# Patient Record
Sex: FEMALE | Race: WHITE | NOT HISPANIC OR LATINO | URBAN - METROPOLITAN AREA
[De-identification: names, ages, dates, MRNs, and addresses within clinical notes are randomized per-mention and may not be internally consistent; named-entity substitution may affect disease eponyms.]

---

## 2020-10-08 ENCOUNTER — OFFICE (OUTPATIENT)
Dept: URBAN - METROPOLITAN AREA CLINIC 64 | Facility: CLINIC | Age: 40
End: 2020-10-08
Payer: COMMERCIAL

## 2020-10-08 VITALS
HEART RATE: 75 BPM | SYSTOLIC BLOOD PRESSURE: 138 MMHG | HEIGHT: 66 IN | DIASTOLIC BLOOD PRESSURE: 89 MMHG | WEIGHT: 211 LBS

## 2020-10-08 DIAGNOSIS — K59.09 OTHER CONSTIPATION: ICD-10-CM

## 2020-10-08 DIAGNOSIS — R10.32 LEFT LOWER QUADRANT PAIN: ICD-10-CM

## 2020-10-08 DIAGNOSIS — R19.5 OTHER FECAL ABNORMALITIES: ICD-10-CM

## 2020-10-08 DIAGNOSIS — K55.061: ICD-10-CM

## 2020-10-08 DIAGNOSIS — M54.5 LOW BACK PAIN: ICD-10-CM

## 2020-10-08 PROCEDURE — 99203 OFFICE O/P NEW LOW 30 MIN: CPT | Performed by: INTERNAL MEDICINE

## 2020-10-08 RX ORDER — LINACLOTIDE 290 UG/1
290 CAPSULE, GELATIN COATED ORAL
Qty: 90 | Refills: 3 | Status: COMPLETED
Start: 2020-10-08 | End: 2020-11-17

## 2020-11-18 ENCOUNTER — ON CAMPUS - OUTPATIENT (OUTPATIENT)
Dept: URBAN - METROPOLITAN AREA HOSPITAL 2 | Facility: HOSPITAL | Age: 40
End: 2020-11-18
Payer: COMMERCIAL

## 2020-11-18 VITALS
SYSTOLIC BLOOD PRESSURE: 120 MMHG | DIASTOLIC BLOOD PRESSURE: 86 MMHG | HEART RATE: 88 BPM | DIASTOLIC BLOOD PRESSURE: 72 MMHG | HEART RATE: 78 BPM | HEIGHT: 66 IN | OXYGEN SATURATION: 99 % | HEART RATE: 71 BPM | RESPIRATION RATE: 16 BRPM | OXYGEN SATURATION: 98 % | WEIGHT: 208 LBS | DIASTOLIC BLOOD PRESSURE: 96 MMHG | SYSTOLIC BLOOD PRESSURE: 122 MMHG | SYSTOLIC BLOOD PRESSURE: 131 MMHG | HEART RATE: 81 BPM | OXYGEN SATURATION: 100 % | SYSTOLIC BLOOD PRESSURE: 126 MMHG | DIASTOLIC BLOOD PRESSURE: 83 MMHG | TEMPERATURE: 97.6 F | DIASTOLIC BLOOD PRESSURE: 85 MMHG | HEART RATE: 90 BPM | RESPIRATION RATE: 18 BRPM | HEART RATE: 79 BPM | DIASTOLIC BLOOD PRESSURE: 84 MMHG | DIASTOLIC BLOOD PRESSURE: 80 MMHG | HEART RATE: 99 BPM | SYSTOLIC BLOOD PRESSURE: 115 MMHG | SYSTOLIC BLOOD PRESSURE: 150 MMHG | RESPIRATION RATE: 17 BRPM

## 2020-11-18 DIAGNOSIS — R10.32 LEFT LOWER QUADRANT PAIN: ICD-10-CM

## 2020-11-18 DIAGNOSIS — K64.0 FIRST DEGREE HEMORRHOIDS: ICD-10-CM

## 2020-11-18 PROCEDURE — 45378 DIAGNOSTIC COLONOSCOPY: CPT | Performed by: INTERNAL MEDICINE

## 2023-04-07 ENCOUNTER — APPOINTMENT (OUTPATIENT)
Dept: MRI IMAGING | Facility: HOSPITAL | Age: 43
End: 2023-04-07
Payer: MEDICAID

## 2023-04-07 ENCOUNTER — APPOINTMENT (OUTPATIENT)
Dept: CT IMAGING | Facility: HOSPITAL | Age: 43
End: 2023-04-07
Payer: MEDICAID

## 2023-04-07 ENCOUNTER — HOSPITAL ENCOUNTER (OUTPATIENT)
Facility: HOSPITAL | Age: 43
Setting detail: OBSERVATION
Discharge: HOME | End: 2023-04-09
Attending: EMERGENCY MEDICINE | Admitting: EMERGENCY MEDICINE
Payer: MEDICAID

## 2023-04-07 ENCOUNTER — APPOINTMENT (OUTPATIENT)
Dept: GENERAL RADIOLOGY | Facility: HOSPITAL | Age: 43
End: 2023-04-07
Payer: MEDICAID

## 2023-04-07 DIAGNOSIS — M54.10: ICD-10-CM

## 2023-04-07 DIAGNOSIS — R53.1 LEFT-SIDED WEAKNESS: Primary | ICD-10-CM

## 2023-04-07 DIAGNOSIS — M51.36 BULGING OF LUMBAR INTERVERTEBRAL DISC WITHOUT MYELOPATHY: ICD-10-CM

## 2023-04-07 DIAGNOSIS — M51.36 BULGING LUMBAR DISC: ICD-10-CM

## 2023-04-07 DIAGNOSIS — M15.9 PRIMARY OSTEOARTHRITIS INVOLVING MULTIPLE JOINTS: ICD-10-CM

## 2023-04-07 PROBLEM — G44.209 TENSION TYPE HEADACHE: Status: ACTIVE | Noted: 2023-04-07

## 2023-04-07 PROBLEM — G50.0 TRIGEMINAL NEURALGIA: Status: ACTIVE | Noted: 2023-04-07

## 2023-04-07 PROBLEM — M19.91 PRIMARY OSTEOARTHRITIS: Status: ACTIVE | Noted: 2023-04-07

## 2023-04-07 PROBLEM — I10 PRIMARY HYPERTENSION: Status: ACTIVE | Noted: 2023-04-07

## 2023-04-07 LAB
ABO GROUP BLD: NORMAL
ALBUMIN SERPL-MCNC: 4.7 G/DL (ref 3.5–5.2)
ALBUMIN/GLOB SERPL: 1.4 G/DL
ALP SERPL-CCNC: 84 U/L (ref 39–117)
ALT SERPL W P-5'-P-CCNC: 15 U/L (ref 1–33)
AMPHET+METHAMPHET UR QL: NEGATIVE
ANION GAP SERPL CALCULATED.3IONS-SCNC: 11 MMOL/L (ref 5–15)
APTT PPP: 29.9 SECONDS (ref 24–31)
AST SERPL-CCNC: 18 U/L (ref 1–32)
BARBITURATES UR QL SCN: NEGATIVE
BASOPHILS # BLD AUTO: 0 10*3/MM3 (ref 0–0.2)
BASOPHILS NFR BLD AUTO: 0.6 % (ref 0–1.5)
BENZODIAZ UR QL SCN: NEGATIVE
BILIRUB SERPL-MCNC: 0.2 MG/DL (ref 0–1.2)
BLD GP AB SCN SERPL QL: NEGATIVE
BUN SERPL-MCNC: 6 MG/DL (ref 6–20)
BUN/CREAT SERPL: 8.8 (ref 7–25)
CALCIUM SPEC-SCNC: 9.6 MG/DL (ref 8.6–10.5)
CANNABINOIDS SERPL QL: NEGATIVE
CHLORIDE SERPL-SCNC: 99 MMOL/L (ref 98–107)
CO2 SERPL-SCNC: 28 MMOL/L (ref 22–29)
COCAINE UR QL: NEGATIVE
CREAT SERPL-MCNC: 0.68 MG/DL (ref 0.57–1)
DEPRECATED RDW RBC AUTO: 41.1 FL (ref 37–54)
EGFRCR SERPLBLD CKD-EPI 2021: 111 ML/MIN/1.73
EOSINOPHIL # BLD AUTO: 0.2 10*3/MM3 (ref 0–0.4)
EOSINOPHIL NFR BLD AUTO: 3.6 % (ref 0.3–6.2)
ERYTHROCYTE [DISTWIDTH] IN BLOOD BY AUTOMATED COUNT: 12.7 % (ref 12.3–15.4)
GLOBULIN UR ELPH-MCNC: 3.4 GM/DL
GLUCOSE BLDC GLUCOMTR-MCNC: 82 MG/DL (ref 70–105)
GLUCOSE SERPL-MCNC: 87 MG/DL (ref 65–99)
HCT VFR BLD AUTO: 48 % (ref 34–46.6)
HGB BLD-MCNC: 15.4 G/DL (ref 12–15.9)
HOLD SPECIMEN: NORMAL
INR PPP: 0.95 (ref 0.93–1.1)
LYMPHOCYTES # BLD AUTO: 1.6 10*3/MM3 (ref 0.7–3.1)
LYMPHOCYTES NFR BLD AUTO: 26.2 % (ref 19.6–45.3)
MCH RBC QN AUTO: 30.2 PG (ref 26.6–33)
MCHC RBC AUTO-ENTMCNC: 32.1 G/DL (ref 31.5–35.7)
MCV RBC AUTO: 94 FL (ref 79–97)
METHADONE UR QL SCN: NEGATIVE
MONOCYTES # BLD AUTO: 0.5 10*3/MM3 (ref 0.1–0.9)
MONOCYTES NFR BLD AUTO: 8.2 % (ref 5–12)
NEUTROPHILS NFR BLD AUTO: 3.7 10*3/MM3 (ref 1.7–7)
NEUTROPHILS NFR BLD AUTO: 61.4 % (ref 42.7–76)
NRBC BLD AUTO-RTO: 0.1 /100 WBC (ref 0–0.2)
OPIATES UR QL: NEGATIVE
OXYCODONE UR QL SCN: NEGATIVE
PLATELET # BLD AUTO: 372 10*3/MM3 (ref 140–450)
PMV BLD AUTO: 7.7 FL (ref 6–12)
POTASSIUM SERPL-SCNC: 3.9 MMOL/L (ref 3.5–5.2)
PROT SERPL-MCNC: 8.1 G/DL (ref 6–8.5)
PROTHROMBIN TIME: 9.8 SECONDS (ref 9.6–11.7)
RBC # BLD AUTO: 5.11 10*6/MM3 (ref 3.77–5.28)
RH BLD: POSITIVE
SODIUM SERPL-SCNC: 138 MMOL/L (ref 136–145)
T&S EXPIRATION DATE: NORMAL
TROPONIN T SERPL HS-MCNC: <6 NG/L
WBC NRBC COR # BLD: 6.1 10*3/MM3 (ref 3.4–10.8)
WHOLE BLOOD HOLD COAG: NORMAL
WHOLE BLOOD HOLD SPECIMEN: NORMAL
WHOLE BLOOD HOLD SPECIMEN: NORMAL

## 2023-04-07 PROCEDURE — 99285 EMERGENCY DEPT VISIT HI MDM: CPT

## 2023-04-07 PROCEDURE — G0378 HOSPITAL OBSERVATION PER HR: HCPCS

## 2023-04-07 PROCEDURE — 25010000002 ONDANSETRON PER 1 MG: Performed by: INTERNAL MEDICINE

## 2023-04-07 PROCEDURE — 80307 DRUG TEST PRSMV CHEM ANLYZR: CPT | Performed by: INTERNAL MEDICINE

## 2023-04-07 PROCEDURE — 85025 COMPLETE CBC W/AUTO DIFF WBC: CPT | Performed by: EMERGENCY MEDICINE

## 2023-04-07 PROCEDURE — 70450 CT HEAD/BRAIN W/O DYE: CPT

## 2023-04-07 PROCEDURE — 84484 ASSAY OF TROPONIN QUANT: CPT | Performed by: EMERGENCY MEDICINE

## 2023-04-07 PROCEDURE — 96361 HYDRATE IV INFUSION ADD-ON: CPT

## 2023-04-07 PROCEDURE — 25510000001 IOPAMIDOL PER 1 ML: Performed by: EMERGENCY MEDICINE

## 2023-04-07 PROCEDURE — 86850 RBC ANTIBODY SCREEN: CPT | Performed by: EMERGENCY MEDICINE

## 2023-04-07 PROCEDURE — 86901 BLOOD TYPING SEROLOGIC RH(D): CPT | Performed by: EMERGENCY MEDICINE

## 2023-04-07 PROCEDURE — 82962 GLUCOSE BLOOD TEST: CPT

## 2023-04-07 PROCEDURE — 85730 THROMBOPLASTIN TIME PARTIAL: CPT | Performed by: EMERGENCY MEDICINE

## 2023-04-07 PROCEDURE — 85610 PROTHROMBIN TIME: CPT | Performed by: EMERGENCY MEDICINE

## 2023-04-07 PROCEDURE — 36415 COLL VENOUS BLD VENIPUNCTURE: CPT | Performed by: INTERNAL MEDICINE

## 2023-04-07 PROCEDURE — 71045 X-RAY EXAM CHEST 1 VIEW: CPT

## 2023-04-07 PROCEDURE — 80053 COMPREHEN METABOLIC PANEL: CPT | Performed by: EMERGENCY MEDICINE

## 2023-04-07 PROCEDURE — 86901 BLOOD TYPING SEROLOGIC RH(D): CPT

## 2023-04-07 PROCEDURE — 86900 BLOOD TYPING SEROLOGIC ABO: CPT

## 2023-04-07 PROCEDURE — 96374 THER/PROPH/DIAG INJ IV PUSH: CPT

## 2023-04-07 PROCEDURE — 86900 BLOOD TYPING SEROLOGIC ABO: CPT | Performed by: EMERGENCY MEDICINE

## 2023-04-07 PROCEDURE — 99222 1ST HOSP IP/OBS MODERATE 55: CPT | Performed by: NURSE PRACTITIONER

## 2023-04-07 PROCEDURE — 70498 CT ANGIOGRAPHY NECK: CPT

## 2023-04-07 PROCEDURE — 70496 CT ANGIOGRAPHY HEAD: CPT

## 2023-04-07 PROCEDURE — 70551 MRI BRAIN STEM W/O DYE: CPT

## 2023-04-07 PROCEDURE — 93005 ELECTROCARDIOGRAM TRACING: CPT | Performed by: EMERGENCY MEDICINE

## 2023-04-07 RX ORDER — OXYCODONE HYDROCHLORIDE 5 MG/1
5 TABLET ORAL EVERY 4 HOURS PRN
Status: DISCONTINUED | OUTPATIENT
Start: 2023-04-07 | End: 2023-04-09 | Stop reason: HOSPADM

## 2023-04-07 RX ORDER — OXYCODONE HYDROCHLORIDE 5 MG/1
10 TABLET ORAL EVERY 4 HOURS PRN
Status: DISCONTINUED | OUTPATIENT
Start: 2023-04-07 | End: 2023-04-09 | Stop reason: HOSPADM

## 2023-04-07 RX ORDER — ATORVASTATIN CALCIUM 40 MG/1
80 TABLET, FILM COATED ORAL NIGHTLY
Status: DISCONTINUED | OUTPATIENT
Start: 2023-04-07 | End: 2023-04-09 | Stop reason: HOSPADM

## 2023-04-07 RX ORDER — ACETAMINOPHEN 650 MG/1
650 SUPPOSITORY RECTAL EVERY 4 HOURS PRN
Status: DISCONTINUED | OUTPATIENT
Start: 2023-04-07 | End: 2023-04-09 | Stop reason: HOSPADM

## 2023-04-07 RX ORDER — ASPIRIN 325 MG
325 TABLET ORAL DAILY
Status: DISCONTINUED | OUTPATIENT
Start: 2023-04-08 | End: 2023-04-09 | Stop reason: HOSPADM

## 2023-04-07 RX ORDER — POLYETHYLENE GLYCOL 3350 17 G/17G
17 POWDER, FOR SOLUTION ORAL DAILY PRN
Status: DISCONTINUED | OUTPATIENT
Start: 2023-04-07 | End: 2023-04-09 | Stop reason: HOSPADM

## 2023-04-07 RX ORDER — LAMOTRIGINE 25 MG/1
25 TABLET ORAL 2 TIMES DAILY
COMMUNITY

## 2023-04-07 RX ORDER — BISACODYL 10 MG
10 SUPPOSITORY, RECTAL RECTAL DAILY PRN
Status: DISCONTINUED | OUTPATIENT
Start: 2023-04-07 | End: 2023-04-09 | Stop reason: HOSPADM

## 2023-04-07 RX ORDER — SODIUM CHLORIDE 0.9 % (FLUSH) 0.9 %
10 SYRINGE (ML) INJECTION AS NEEDED
Status: DISCONTINUED | OUTPATIENT
Start: 2023-04-07 | End: 2023-04-09 | Stop reason: HOSPADM

## 2023-04-07 RX ORDER — ASPIRIN 325 MG
325 TABLET ORAL ONCE
Status: COMPLETED | OUTPATIENT
Start: 2023-04-07 | End: 2023-04-07

## 2023-04-07 RX ORDER — SODIUM CHLORIDE 0.9 % (FLUSH) 0.9 %
10 SYRINGE (ML) INJECTION EVERY 12 HOURS SCHEDULED
Status: DISCONTINUED | OUTPATIENT
Start: 2023-04-07 | End: 2023-04-09 | Stop reason: HOSPADM

## 2023-04-07 RX ORDER — ACETAMINOPHEN 325 MG/1
650 TABLET ORAL EVERY 4 HOURS PRN
Status: DISCONTINUED | OUTPATIENT
Start: 2023-04-07 | End: 2023-04-09 | Stop reason: HOSPADM

## 2023-04-07 RX ORDER — ONDANSETRON 2 MG/ML
4 INJECTION INTRAMUSCULAR; INTRAVENOUS EVERY 6 HOURS PRN
Status: DISCONTINUED | OUTPATIENT
Start: 2023-04-07 | End: 2023-04-09 | Stop reason: HOSPADM

## 2023-04-07 RX ORDER — SODIUM CHLORIDE 9 MG/ML
40 INJECTION, SOLUTION INTRAVENOUS AS NEEDED
Status: DISCONTINUED | OUTPATIENT
Start: 2023-04-07 | End: 2023-04-09 | Stop reason: HOSPADM

## 2023-04-07 RX ORDER — ACETAMINOPHEN 160 MG/5ML
650 SOLUTION ORAL EVERY 4 HOURS PRN
Status: DISCONTINUED | OUTPATIENT
Start: 2023-04-07 | End: 2023-04-09 | Stop reason: HOSPADM

## 2023-04-07 RX ORDER — LAMOTRIGINE 25 MG/1
25 TABLET ORAL 2 TIMES DAILY
Status: DISCONTINUED | OUTPATIENT
Start: 2023-04-07 | End: 2023-04-09 | Stop reason: HOSPADM

## 2023-04-07 RX ORDER — OXCARBAZEPINE 150 MG/1
450 TABLET, FILM COATED ORAL 2 TIMES DAILY
Status: DISCONTINUED | OUTPATIENT
Start: 2023-04-07 | End: 2023-04-09 | Stop reason: HOSPADM

## 2023-04-07 RX ORDER — NITROGLYCERIN 0.4 MG/1
0.4 TABLET SUBLINGUAL
Status: DISCONTINUED | OUTPATIENT
Start: 2023-04-07 | End: 2023-04-09 | Stop reason: HOSPADM

## 2023-04-07 RX ORDER — SODIUM CHLORIDE 9 MG/ML
100 INJECTION, SOLUTION INTRAVENOUS CONTINUOUS
Status: DISCONTINUED | OUTPATIENT
Start: 2023-04-07 | End: 2023-04-09 | Stop reason: HOSPADM

## 2023-04-07 RX ORDER — BUPROPION HYDROCHLORIDE 150 MG/1
150 TABLET ORAL DAILY
Status: DISCONTINUED | OUTPATIENT
Start: 2023-04-08 | End: 2023-04-09 | Stop reason: HOSPADM

## 2023-04-07 RX ORDER — ALUMINA, MAGNESIA, AND SIMETHICONE 2400; 2400; 240 MG/30ML; MG/30ML; MG/30ML
15 SUSPENSION ORAL EVERY 6 HOURS PRN
Status: DISCONTINUED | OUTPATIENT
Start: 2023-04-07 | End: 2023-04-09 | Stop reason: HOSPADM

## 2023-04-07 RX ORDER — BUPROPION HYDROCHLORIDE 150 MG/1
150 TABLET ORAL DAILY
COMMUNITY

## 2023-04-07 RX ORDER — LISINOPRIL 10 MG/1
10 TABLET ORAL DAILY
COMMUNITY

## 2023-04-07 RX ORDER — LISINOPRIL 5 MG/1
10 TABLET ORAL DAILY
Status: DISCONTINUED | OUTPATIENT
Start: 2023-04-08 | End: 2023-04-09 | Stop reason: HOSPADM

## 2023-04-07 RX ORDER — OXCARBAZEPINE 150 MG/1
450 TABLET, FILM COATED ORAL 2 TIMES DAILY
COMMUNITY

## 2023-04-07 RX ORDER — ASPIRIN 300 MG/1
300 SUPPOSITORY RECTAL DAILY
Status: DISCONTINUED | OUTPATIENT
Start: 2023-04-08 | End: 2023-04-09 | Stop reason: HOSPADM

## 2023-04-07 RX ORDER — AMOXICILLIN 250 MG
2 CAPSULE ORAL 2 TIMES DAILY
Status: DISCONTINUED | OUTPATIENT
Start: 2023-04-07 | End: 2023-04-09 | Stop reason: HOSPADM

## 2023-04-07 RX ORDER — BISACODYL 5 MG/1
5 TABLET, DELAYED RELEASE ORAL DAILY PRN
Status: DISCONTINUED | OUTPATIENT
Start: 2023-04-07 | End: 2023-04-09 | Stop reason: HOSPADM

## 2023-04-07 RX ADMIN — SENNOSIDES AND DOCUSATE SODIUM 2 TABLET: 50; 8.6 TABLET ORAL at 22:39

## 2023-04-07 RX ADMIN — OXCARBAZEPINE 450 MG: 150 TABLET, FILM COATED ORAL at 22:38

## 2023-04-07 RX ADMIN — ONDANSETRON 4 MG: 2 INJECTION INTRAMUSCULAR; INTRAVENOUS at 18:07

## 2023-04-07 RX ADMIN — SODIUM CHLORIDE 100 ML/HR: 9 INJECTION, SOLUTION INTRAVENOUS at 21:25

## 2023-04-07 RX ADMIN — ASPIRIN 325 MG: 325 TABLET ORAL at 15:18

## 2023-04-07 RX ADMIN — IOPAMIDOL 100 ML: 755 INJECTION, SOLUTION INTRAVENOUS at 13:50

## 2023-04-07 RX ADMIN — Medication 10 ML: at 22:32

## 2023-04-07 RX ADMIN — ACETAMINOPHEN 650 MG: 325 TABLET, FILM COATED ORAL at 22:39

## 2023-04-07 RX ADMIN — LAMOTRIGINE 25 MG: 25 TABLET ORAL at 22:39

## 2023-04-07 RX ADMIN — ATORVASTATIN CALCIUM 80 MG: 40 TABLET, FILM COATED ORAL at 22:39

## 2023-04-07 NOTE — H&P
[facility] Medicine Services      Patient Name: Lexis Hernandez  : 1980  MRN: [id number]  Primary Care Physician:  Nataly Barr APRN  Date of admission: 2023      Subjective      Chief Complaint: Bilateral lower extremity weakness, headache, dizziness, nausea and unsteady gait.    History of Present Illness: Lexis Hernandez is a 42 y.o. female who presented to [facility] on 2023 complaining of bilateral lower extremity weakness, headache, nausea and unsteady gait including dragging her feet.  Patient is a 42-year-old female with past medical history of trigeminal neuralgia, osteoarthritis of the knees status post left knee replacement and a primary hypertension who presented to the emergency room because of a sudden onset of a neurologic symptoms.  Patient reported that her symptoms started around 0800 on the day of presentation with right-sided weakness and then shifted to left-sided weakness associated with headache, dizziness, gait abnormality including dragging of her feet.  It was also noted that patient had some numbness on the right side before switching to the left.  The  noted that she started having a frontal headache before the headache became diffused.  Patient reported improvement in her symptoms at the time she was seen but still having difficulties with gait.  A code stroke was called in the emergency room and neurology consult was completed.  CT scan of the head and brain and MRI were completed and were negative.  CT angiogram of the head and the neck were completed and did not show any major vessel stenosis, thrombosis or plaques.    Patient reported no fever or chills, no visual obscurations, no hot or cold intolerance, no constipation or diarrhea, no chest pain or abdominal pain and no leg swelling or leg pain.      Review of Systems   Constitutional: Negative.   Eyes: Negative.    Cardiovascular: Negative.    Respiratory:  Negative.    Endocrine: Negative.    Hematologic/Lymphatic: Negative.    Skin: Negative.    Musculoskeletal: Negative.    Gastrointestinal: Negative.    Genitourinary: Negative.    Neurological: Positive for dizziness, focal weakness, headaches and loss of balance.   Psychiatric/Behavioral: Negative.    Allergic/Immunologic: Negative.           Personal History     Past medical history:  1.  Primary hypertension.  2.  Trigeminal neuralgia.  3.  Primary osteoarthritis of the knee.      Past surgical history:  1.  Left knee arthroplasty.      Family History:   Father: Unknown.  Mother: Anxiety and depression        Social History:     1.  No tobacco.  2.  Occasional alcohol.  3.  No IV drug use.  4.  Patient lives at home with her family,  and kids.      Home Medications:  Prior to Admission Medications     Prescriptions Last Dose Informant Patient Reported? Taking?    buPROPion XL (WELLBUTRIN XL) 150 MG 24 hr tablet 4/7/2023  Yes Yes    Take 1 tablet by mouth Daily.    lamoTRIgine (LaMICtal) 25 MG tablet 4/7/2023  Yes Yes    Take 1 tablet by mouth 2 (Two) Times a Day.    lisinopril (PRINIVIL,ZESTRIL) 10 MG tablet 4/7/2023  Yes Yes    Take 1 tablet by mouth Daily.    OXcarbazepine (TRILEPTAL) 150 MG tablet 4/7/2023  Yes Yes    Take 3 tablets by mouth 2 (Two) Times a Day.            Allergies:  Allergies   Allergen Reactions   • Adhesive Tape Rash       Objective      Vitals:   Temp:  [98.5 °F (36.9 °C)] 98.5 °F (36.9 °C)  Heart Rate:  [64-69] 68  Resp:  [18] 18  BP: (132-159)/(76-94) 132/84    Physical Exam  Vitals reviewed.   Constitutional:       General: She is not in acute distress.  HENT:      Head: Normocephalic and atraumatic.      Nose: Nose normal. No congestion or rhinorrhea.      Mouth/Throat:      Mouth: Mucous membranes are moist.      Pharynx: Oropharynx is clear. No oropharyngeal exudate or posterior oropharyngeal erythema.   Eyes:      Pupils: Pupils are equal, round, and reactive to light.    Cardiovascular:      Pulses: Normal pulses.      Heart sounds: Normal heart sounds. No murmur heard.    No friction rub. No gallop.      Comments: S1 and S2 present.  No tachycardia.  Pulmonary:      Effort: No respiratory distress.      Breath sounds: No wheezing, rhonchi or rales.   Chest:      Chest wall: No tenderness.   Abdominal:      General: Abdomen is flat. Bowel sounds are normal. There is no distension.      Palpations: Abdomen is soft.      Tenderness: There is no abdominal tenderness. There is no right CVA tenderness or guarding.   Musculoskeletal:         General: No swelling, tenderness, deformity or signs of injury.      Cervical back: Neck supple. No tenderness.      Right lower leg: No edema.      Left lower leg: No edema.   Skin:     Capillary Refill: Capillary refill takes less than 2 seconds.      Coloration: Skin is not jaundiced.      Findings: No bruising, lesion or rash.   Neurological:      Mental Status: She is alert and oriented to person, place, and time.      Comments: No facial asymmetry noted.  Gait and station not tested.  Strength reduced on both sides left worse than the right.  Strength 3/5 on the left and 4/5 on the right.  Lower extremities not tested.  Patient was alert and oriented.   Psychiatric:         Behavior: Behavior normal.            Result Review    Result Review:  I have personally reviewed the results from the time of this admission to 4/7/2023 19:33 EDT and agree with these findings:  []  Laboratory  []  Microbiology  []  Radiology  []  EKG/Telemetry   []  Cardiology/Vascular   []  Pathology  []  Old records  []  Other:  Most notable findings include:       Assessment & Plan        Active Hospital Problems:  Active Hospital Problems    Diagnosis    • **Left-sided weakness    • Right sided weakness    • Tension type headache    • Trigeminal neuralgia    • Primary hypertension    • Primary osteoarthritis            Plan:        -Continue appropriate patient's home  medications for other chronic medical conditions.  -Continue the present level of care.  -Patient and family agreed with the plan of care.  -Follow neurology recommendations.  -Continue stroke protocol.  -Treat hypertension with lisinopril and hydralazine as needed.      DVT prophylaxis:  Mechanical DVT prophylaxis orders are present.    CODE STATUS:    Level Of Support Discussed With: Patient  Code Status (Patient has no pulse and is not breathing): CPR (Attempt to Resuscitate)  Medical Interventions (Patient has pulse or is breathing): Full Support  Release to patient: Routine Release    Admission Status:  I believe this patient meets observation status.    I discussed the patient's findings and my recommendations with patient, family, nursing staff, primary care team and consulting provider.    This patient has been examined wearing appropriate Personal Protective Equipment and discussed with hospital infection control department, [facility] department, infectious disease specialist and pulmonologist. 04/07/23      Signature:Electronically signed by Jc Macedo MD, FACP, 04/07/23, 7:09 PM EDT.

## 2023-04-07 NOTE — CASE MANAGEMENT/SOCIAL WORK
Discharge Planning Assessment  [facility]     Patient Name: Lexis Hernandez  MRN: [id number]  Today's Date: 4/7/2023    Admit Date: 4/7/2023    Plan: Return home , PT eval. pending   Discharge Needs Assessment     Row Name 04/07/23 1559       Living Environment    People in Home spouse    Name(s) of People in Home Spouse-Rey    Current Living Arrangements home    Primary Care Provided by self    Provides Primary Care For no one    Family Caregiver if Needed spouse    Quality of Family Relationships helpful;involved;supportive    Able to Return to Prior Arrangements yes       Resource/Environmental Concerns    Transportation Concerns none       Transition Planning    Patient/Family Anticipates Transition to home;home with family    Transportation Anticipated family or friend will provide  spouse       Discharge Needs Assessment    Readmission Within the Last 30 Days no previous admission in last 30 days    Equipment Currently Used at Home none    Concerns to be Addressed denies needs/concerns at this time               Discharge Plan     Row Name 04/07/23 1601       Plan    Plan Return home , PT eval. pending    Plan Comments Spoke with patient at bedside, States IADL's, Confirmed PCP and Pharmacy. No transportation or prescription coverage issues.           Expected Discharge Date and Time     Expected Discharge Date Expected Discharge Time    Apr 8, 2023          Demographic Summary     Row Name 04/07/23 1558       General Information    Admission Type observation    Arrived From home    Referral Source patient;other (see comments)  spouse    Reason for Consult discharge planning    Preferred Language English               Functional Status     Row Name 04/07/23 1559       Functional Status    Usual Activity Tolerance good    Current Activity Tolerance good       Functional Status, IADL    Medications independent    Meal Preparation independent    Housekeeping independent    Laundry independent    Shopping  independent       Mental Status    General Appearance WDL WDL         Brinda Pope RN   Met with patient in room wearing PPE: mask, face shield/goggles, gloves, gown.      Maintained distance greater than six feet and spent less than 15 minutes in the room.

## 2023-04-07 NOTE — CONSULTS
Primary Care Provider: Nataly Barr APRN     Consult requested by:  Dr. Vazquez     Reason for Consultation: Neurological evaluation, code stroke     History taken from: patient chart RN    Chief complaint: weakness        SUBJECTIVE:    History of present illness: Background per H&P: Lexis Hernandez is a 42 y.o. year old female who presents to [facility] with complaints of dizziness and left side weakness. Her symptoms started when she first woke up this AM with right sided numbness and weakness. Shortly after, the right side resolved and patient then had left sided symptoms. Code stroke was [facility].  Patient was evaluated by Dr. Vilchis and myself in CT scanner immediately.     Patient has eyes closed but answers questions appropriately, oriented x3.  Patient states she is weak on the left arm and leg along with numbness.  Patient seemed very anxious exam is fairly inconsistent after multiple strength testing. Pt does lift both arm and leg barely off the table and able to hold but obviously more weak on the left. She complains of low back pain that started after falling. Speech is fine, no aphasia. Unable to check ataxia on the left but right side is intact. Symptoms started when she woke up therefore LKW would be around 11 pm the previous night.     CT head reviewed and normal. CTA did not show any significant large vessel occlusion. Recommending MRI brain to evaluate for lesion. Personally discussed case with Dr. Vazquez.         Review of Systems   HENT: Negative.    Eyes: Negative for visual disturbance.   Respiratory: Negative.    Cardiovascular: Negative.    Gastrointestinal: Negative for diarrhea and nausea.   Endocrine: Negative.    Genitourinary: Negative.    Musculoskeletal: Positive for back pain and gait problem.   Allergic/Immunologic: Negative.    Neurological: Positive for dizziness, weakness and numbness. Negative for tremors, seizures, syncope, facial asymmetry,  speech difficulty, light-headedness and headaches.   Hematological: Negative.    Psychiatric/Behavioral: Negative for confusion. Behavioral problem:         PATIENT HISTORY:  No past medical history on file., No past surgical history on file., No family history on file.,  ,   Prior to Admission medications    Not on File    Allergies:  Adhesive tape    Current Facility-Administered Medications   Medication Dose Route Frequency Provider Last Rate Last Admin   • aspirin tablet 325 mg  325 mg Oral Once Rafael Vazquez MD       • sodium chloride 0.9 % flush 10 mL  10 mL Intravenous PRN Rafael Vazquez MD         No current outpatient medications on file.        ________________________________________________________        OBJECTIVE:  PHYSICAL EXAM:    Constitutional: The patient is awake but in obvious discomfort and anxious. There is no shortness of breath.     PSYCHIATRIC: anxious     HEENT: Normocephalic, atraumatic.     Chest: Breathing unlabored    Cardiac: Regular rate and rhythm.     Extremities:  No clubbing, cyanosis or edema.    NEUROLOGICAL:    Cognition:   Fully oriented.   Fund of knowledge excellent.  Concentration and attention normal.   Language normal with normal comprehension, fluent speech, intact repetition and naming.   Short and long term memory appears intact     Cranial nerves;    II - pupils bilaterally equal reacting to light,  No new Visual field deficits;  Fundoscopic exam- Not able to be done, non-dilated exam  III,IV,VI: EOMI with no diplopia  V: Normal facial sensations  VII: No facial asymmetry,  VIII: No New hearing abnormality  IX, X, XI: normal gag and shoulder shrug;  XII: tongue is in the midline.    Sensory:  Intact to light touch in all extremities.     Motor: Strength 4+/5 LUE and LLE. Hand grasp are strong.     Cerebellar: Finger to nose and mirror movements normal bilaterally.    Gait and balance: Deferred.     Physical exam performed by Kavitha Beauchamp  ARNP.    ________________________________________________________   RESULTS REVIEW:    VITAL SIGNS:   Temp:  [98.5 °F (36.9 °C)] 98.5 °F (36.9 °C)  Heart Rate:  [65-68] 68  Resp:  [18] 18  BP: (140-157)/(81-86) 140/86     LABS:      Lab 04/07/23  1329   WBC 6.10   HEMOGLOBIN 15.4   HEMATOCRIT 48.0*   PLATELETS 372   NEUTROS ABS 3.70   LYMPHS ABS 1.60   MONOS ABS 0.50   EOS ABS 0.20   MCV 94.0         Lab 04/07/23  1329   SODIUM 138   POTASSIUM 3.9   CHLORIDE 99   CO2 28.0   ANION GAP 11.0   BUN 6   CREATININE 0.68   EGFR 111.0   GLUCOSE 87   CALCIUM 9.6         Lab 04/07/23  1329   TOTAL PROTEIN 8.1   ALBUMIN 4.7   GLOBULIN 3.4   ALT (SGPT) 15   AST (SGOT) 18   BILIRUBIN 0.2   ALK PHOS 84         Lab 04/07/23  1329   HSTROP T <6                     Lab Results   Component Value Date    TSH 1.090 03/09/2022    ZOSFLOOC48 1,114 (H) 02/10/2021       IMAGING STUDIES:  CT Angiogram Neck    Result Date: 4/7/2023  Impression: Major arterial vasculature within head and neck appears widely patent, with no hemodynamically significant stenosis, dissection, thrombus, or aneurysm. Electronically Signed: Nayan Diallo  4/7/2023 2:11 PM EDT  Workstation ID: [id number]    CT Head Without Contrast Stroke Protocol    Result Date: 4/7/2023  Impression: Normal noncontrast CT of the brain. Electronically Signed: Rafael Roman  4/7/2023 1:47 PM EDT  Workstation ID: [id number]    CT Angiogram Head w AI Analysis of LVO    Result Date: 4/7/2023  Impression: Major arterial vasculature within head and neck appears widely patent, with no hemodynamically significant stenosis, dissection, thrombus, or aneurysm. Electronically Signed: Nayan Diallo  4/7/2023 2:11 PM EDT  Workstation ID: [id number]      I reviewed the patient's new clinical results.    ________________________________________________________     PROBLEM LIST:    * No active hospital problems. *            ASSESSMENT/PLAN:    Right sided arm and leg numbness/weakness followed by left  sided symptoms. Patient also complaining of dizziness and nausea. Very atypical presentation for stroke/TIA. Anxiety may also play a role. This does not appear to be primary neurologic.   - MRI brain is negative and CTA does not show significant stenosis, dissection, thrombus or aneurysm   - Recommend primary to treat/manage dizziness, nausea and headache   - PT/OT eval and treat   - Nothing else to add from inpatient neuro at this time           I discussed the patient's findings and my recommendations with patient and nursing staff    KIRSTIN Tay  04/07/23  14:55 EDT

## 2023-04-07 NOTE — ED PROVIDER NOTES
Subjective   History of Present Illness  Chief complaint dizzy off balance left-sided weakness    History of present illness 42-year-old female who states that she got up at 8:00 this morning seem to be okay and about 825 or so she started feeling dizzy and off balance and leaning to the left and subsequently fell like she has some numbness to the right side and then switched over to the left and felt weak on her left side and fell like she was dragging her leg.  She did have a diffuse headache as well.  This is gradually gotten worse.  No thunderclap.  There was no visual loss.  There was no speech difficulty.  No recent cough congestion fevers flus or viruses no recent trauma.  No recent vaccinations.  No other complaints or associated symptoms time no chest pain neck arm jaw pain or shortness of breath.        Review of Systems   Constitutional: Negative for chills and fever.   Eyes: Negative for photophobia and visual disturbance.   Respiratory: Negative for chest tightness and shortness of breath.    Cardiovascular: Negative for chest pain and palpitations.   Gastrointestinal: Negative for abdominal pain and vomiting.   Genitourinary: Negative for difficulty urinating and dysuria.   Musculoskeletal: Negative for back pain and neck pain.   Skin: Negative for rash and wound.   Neurological: Positive for weakness and headaches. Negative for facial asymmetry, speech difficulty and light-headedness.   Psychiatric/Behavioral: Negative for agitation and confusion.       No past medical history on file.  Hypertension anxiety  Allergies   Allergen Reactions   • Adhesive Tape Rash       No past surgical history on file.    No family history on file.    Social History     Socioeconomic History   • Marital status:    Social history no current tobacco alcohol or drug  Prior to Admission medications    Medication Sig Start Date End Date Taking? Authorizing Provider   buPROPion XL (WELLBUTRIN XL) 150 MG 24 hr tablet  Take 1 tablet by mouth Daily.   Yes ProviderGigi MD   lamoTRIgine (LaMICtal) 25 MG tablet Take 1 tablet by mouth 2 (Two) Times a Day.   Yes Gigi Quinn MD   lisinopril (PRINIVIL,ZESTRIL) 10 MG tablet Take 1 tablet by mouth Daily.   Yes Gigi Quinn MD   OXcarbazepine (TRILEPTAL) 150 MG tablet Take 3 tablets by mouth 2 (Two) Times a Day.   Yes Provider, MD Gigi           Objective   Physical Exam  Constitutional is a 42-year-old female awake alert no distress triage vital signs reviewed.  HEENT extraocular muscles are intact pupils equal round react there is no photophobia mouth is clear neck supple no adenopathy no JV no bruits lungs clear no retraction no use of accessories.  Heart regular without murmur.  Abdomen soft without tenderness good bowel sounds no peritoneal findings or pulsatile masses extremities pulses equal throughout upper and lower extremities no edema cords or Homans' sign or evidence of DVT.  Skin warm and dry without rashes or cellulitic changes neurologic awake alert orientated x4 no facial asymmetry normal speech tongue soft palate normal peripheral vision intact confrontation there is no nystagmus no drift in the arms normal finger-to-nose she does have a slight drift in the left leg toes downgoing no other focal weaknesses reflexes 2+ symmetrical throughout upper and lower extremities.  NIH of 1  Procedures           ED Course      Results for orders placed or performed during the hospital encounter of 04/07/23   Comprehensive Metabolic Panel    Specimen: Blood   Result Value Ref Range    Glucose 87 65 - 99 mg/dL    BUN 6 6 - 20 mg/dL    Creatinine 0.68 0.57 - 1.00 mg/dL    Sodium 138 136 - 145 mmol/L    Potassium 3.9 3.5 - 5.2 mmol/L    Chloride 99 98 - 107 mmol/L    CO2 28.0 22.0 - 29.0 mmol/L    Calcium 9.6 8.6 - 10.5 mg/dL    Total Protein 8.1 6.0 - 8.5 g/dL    Albumin 4.7 3.5 - 5.2 g/dL    ALT (SGPT) 15 1 - 33 U/L    AST (SGOT) 18 1 - 32 U/L     Alkaline Phosphatase 84 39 - 117 U/L    Total Bilirubin 0.2 0.0 - 1.2 mg/dL    Globulin 3.4 gm/dL    A/G Ratio 1.4 g/dL    BUN/Creatinine Ratio 8.8 7.0 - 25.0    Anion Gap 11.0 5.0 - 15.0 mmol/L    eGFR 111.0 >60.0 mL/min/1.73   Protime-INR    Specimen: Blood   Result Value Ref Range    Protime 9.8 9.6 - 11.7 Seconds    INR 0.95 0.93 - 1.10   aPTT    Specimen: Blood   Result Value Ref Range    PTT 29.9 24.0 - 31.0 seconds   Single High Sensitivity Troponin T    Specimen: Blood   Result Value Ref Range    HS Troponin T <6 <10 ng/L   CBC Auto Differential    Specimen: Blood   Result Value Ref Range    WBC 6.10 3.40 - 10.80 10*3/mm3    RBC 5.11 3.77 - 5.28 10*6/mm3    Hemoglobin 15.4 12.0 - 15.9 g/dL    Hematocrit 48.0 (H) 34.0 - 46.6 %    MCV 94.0 79.0 - 97.0 fL    MCH 30.2 26.6 - 33.0 pg    MCHC 32.1 31.5 - 35.7 g/dL    RDW 12.7 12.3 - 15.4 %    RDW-SD 41.1 37.0 - 54.0 fl    MPV 7.7 6.0 - 12.0 fL    Platelets 372 140 - 450 10*3/mm3    Neutrophil % 61.4 42.7 - 76.0 %    Lymphocyte % 26.2 19.6 - 45.3 %    Monocyte % 8.2 5.0 - 12.0 %    Eosinophil % 3.6 0.3 - 6.2 %    Basophil % 0.6 0.0 - 1.5 %    Neutrophils, Absolute 3.70 1.70 - 7.00 10*3/mm3    Lymphocytes, Absolute 1.60 0.70 - 3.10 10*3/mm3    Monocytes, Absolute 0.50 0.10 - 0.90 10*3/mm3    Eosinophils, Absolute 0.20 0.00 - 0.40 10*3/mm3    Basophils, Absolute 0.00 0.00 - 0.20 10*3/mm3    nRBC 0.1 0.0 - 0.2 /100 WBC   POC Glucose Once    Specimen: Blood   Result Value Ref Range    Glucose 82 70 - 105 mg/dL   ECG 12 Lead Stroke Evaluation   Result Value Ref Range    QT Interval 420 ms   Green Top (Gel)   Result Value Ref Range    Extra Tube done    Lavender Top   Result Value Ref Range    Extra Tube hold for add-on      CT Angiogram Neck    Result Date: 4/7/2023  Impression: Major arterial vasculature within head and neck appears widely patent, with no hemodynamically significant stenosis, dissection, thrombus, or aneurysm. Electronically Signed: Nayan Diallo   4/7/2023 2:11 PM EDT  Workstation ID: [id number]    MRI Brain Without Contrast    Result Date: 4/7/2023  Impression: No acute abnormality is identified within the brain. Specifically, no diffusion restriction is identified to suggest acute infarct. Electronically Signed: Luc Richter  4/7/2023 2:59 PM EDT  Workstation ID: [id number]    XR Chest 1 View    Result Date: 4/7/2023  No radiographic findings of acute cardiopulmonary abnormality.  Electronically Signed: Delvin Hima  4/7/2023 3:31 PM EDT  Workstation ID: [id number]    CT Head Without Contrast Stroke Protocol    Result Date: 4/7/2023  Impression: Normal noncontrast CT of the brain. Electronically Signed: Rafael Roman  4/7/2023 1:47 PM EDT  Workstation ID: [id number]    CT Angiogram Head w AI Analysis of LVO    Result Date: 4/7/2023  Impression: Major arterial vasculature within head and neck appears widely patent, with no hemodynamically significant stenosis, dissection, thrombus, or aneurysm. Electronically Signed: Nayan Diallo  4/7/2023 2:11 PM EDT  Workstation ID: [id number]    Medications   sodium chloride 0.9 % flush 10 mL (has no administration in time range)   iopamidol (ISOVUE-370) 76 % injection 100 mL (100 mL Intravenous Given 4/7/23 1350)   aspirin tablet 325 mg (325 mg Oral Given 4/7/23 1518)       EKG my interpretation normal sinus rhythm rate of 68 normal axis no hypertrophy QTc of 446 normal EKG                                     Medical Decision Making  Medical decision making.  Patient had IV established she was on the monitor sinus rhythm she is outside the window for tPA as this has been 4 hours.  But with dizziness and off-balance and left leg weakness she was sent over for a code stroke to rule out posterior fossa occlusion.  And neurology notified.  The patient CT head without on my review shows no acute hemorrhage or acute stroke or masses.  CT angiogram on my independent review no large vessel occlusion or other acute findings or  dissection.  Radiology views is unremarkable.  Labs independent reviewed by me competence metabolic profile negative coags troponin CBC unremarkable.  Patient given aspirin p.o. on repeat exam she was resting comfortably.  Still seems to have a little bit of left leg weakness NIH of 1.  I have talked to neurology.  I talked to the hospitalist nurse practitioner.  Family made aware of the findings.  She will undergo an MRI as well.  I do not see evidence of an acute large vessel occlusion or air or any type of dissection no evidence of meningitis or encephalitis or acute cardiac issues EKG obtained reviewed by me showed normal sinus rhythm without acute ST elevation without acute findings.  This is not a complete list of all possibilities that can occur patient should be placed in the hospital for further work-up and stable otherwise unremarkable ER course.    Left-sided weakness: acute illness or injury  Amount and/or Complexity of Data Reviewed  Labs: ordered. Decision-making details documented in ED Course.  Radiology: ordered and independent interpretation performed. Decision-making details documented in ED Course.  ECG/medicine tests: ordered and independent interpretation performed.      Risk  Decision regarding hospitalization.          Final diagnoses:   Left-sided weakness       ED Disposition  ED Disposition     ED Disposition   Decision to Admit    Condition   --    Comment   Level of Care: Critical Care [6]   Admitting Physician: KEVIN SOTO [[id number]]   Attending Physician: KEVIN SOTO [[id number]]   Bed Request Comments: valeria               No follow-up provider specified.       Medication List      No changes were made to your prescriptions during this visit.          Rafael Vazquez MD  04/07/23 5400

## 2023-04-07 NOTE — Clinical Note
Level of Care: Critical Care [6]   Admitting Physician: KEVIN SOTO [[id number]]   Attending Physician: KEVIN SOTO [[id number]]   Bed Request Comments: valeria

## 2023-04-08 ENCOUNTER — APPOINTMENT (OUTPATIENT)
Dept: CARDIOLOGY | Facility: HOSPITAL | Age: 43
End: 2023-04-08
Payer: MEDICAID

## 2023-04-08 ENCOUNTER — APPOINTMENT (OUTPATIENT)
Dept: MRI IMAGING | Facility: HOSPITAL | Age: 43
End: 2023-04-08
Payer: MEDICAID

## 2023-04-08 PROBLEM — M51.36 BULGING OF LUMBAR INTERVERTEBRAL DISC WITHOUT MYELOPATHY: Status: RESOLVED | Noted: 2023-04-08 | Resolved: 2023-04-08

## 2023-04-08 PROBLEM — M54.10: Status: ACTIVE | Noted: 2023-04-08

## 2023-04-08 PROBLEM — M51.36 BULGING LUMBAR DISC: Status: ACTIVE | Noted: 2023-04-08

## 2023-04-08 PROBLEM — M51.36 BULGING OF LUMBAR INTERVERTEBRAL DISC WITHOUT MYELOPATHY: Status: ACTIVE | Noted: 2023-04-08

## 2023-04-08 LAB
ANION GAP SERPL CALCULATED.3IONS-SCNC: 9 MMOL/L (ref 5–15)
BASOPHILS # BLD AUTO: 0 10*3/MM3 (ref 0–0.2)
BASOPHILS NFR BLD AUTO: 0.5 % (ref 0–1.5)
BH CV ECHO MEAS - ACS: 2.4 CM
BH CV ECHO MEAS - AO MAX PG: 12.3 MMHG
BH CV ECHO MEAS - AO MEAN PG: 7 MMHG
BH CV ECHO MEAS - AO ROOT DIAM: 2.9 CM
BH CV ECHO MEAS - AO V2 MAX: 175 CM/SEC
BH CV ECHO MEAS - AO V2 VTI: 34.7 CM
BH CV ECHO MEAS - AVA(I,D): 2.6 CM2
BH CV ECHO MEAS - EDV(CUBED): 91.1 ML
BH CV ECHO MEAS - EDV(MOD-SP4): 66.7 ML
BH CV ECHO MEAS - EF(MOD-BP): 66 %
BH CV ECHO MEAS - EF(MOD-SP4): 66.1 %
BH CV ECHO MEAS - ESV(CUBED): 27 ML
BH CV ECHO MEAS - ESV(MOD-SP4): 22.6 ML
BH CV ECHO MEAS - FS: 33.3 %
BH CV ECHO MEAS - IVS/LVPW: 1.33 CM
BH CV ECHO MEAS - IVSD: 1.2 CM
BH CV ECHO MEAS - LA DIMENSION: 2.8 CM
BH CV ECHO MEAS - LAT PEAK E' VEL: 14.2 CM/SEC
BH CV ECHO MEAS - LV DIASTOLIC VOL/BSA (35-75): 34.1 CM2
BH CV ECHO MEAS - LV MASS(C)D: 164 GRAMS
BH CV ECHO MEAS - LV MAX PG: 9.1 MMHG
BH CV ECHO MEAS - LV MEAN PG: 4 MMHG
BH CV ECHO MEAS - LV SYSTOLIC VOL/BSA (12-30): 11.5 CM2
BH CV ECHO MEAS - LV V1 MAX: 151 CM/SEC
BH CV ECHO MEAS - LV V1 VTI: 31.3 CM
BH CV ECHO MEAS - LVIDD: 4.5 CM
BH CV ECHO MEAS - LVIDS: 3 CM
BH CV ECHO MEAS - LVOT AREA: 2.8 CM2
BH CV ECHO MEAS - LVOT DIAM: 1.9 CM
BH CV ECHO MEAS - LVPWD: 0.9 CM
BH CV ECHO MEAS - MED PEAK E' VEL: 9.8 CM/SEC
BH CV ECHO MEAS - MV A DUR: 0.14 SEC
BH CV ECHO MEAS - MV A MAX VEL: 80.7 CM/SEC
BH CV ECHO MEAS - MV DEC SLOPE: 431.5 CM/SEC2
BH CV ECHO MEAS - MV DEC TIME: 0.2 MSEC
BH CV ECHO MEAS - MV E MAX VEL: 91.7 CM/SEC
BH CV ECHO MEAS - MV E/A: 1.14
BH CV ECHO MEAS - MV MAX PG: 3.3 MMHG
BH CV ECHO MEAS - MV MEAN PG: 2 MMHG
BH CV ECHO MEAS - MV P1/2T: 67.9 MSEC
BH CV ECHO MEAS - MV V2 VTI: 26.2 CM
BH CV ECHO MEAS - MVA(P1/2T): 3.2 CM2
BH CV ECHO MEAS - MVA(VTI): 3.4 CM2
BH CV ECHO MEAS - PA ACC TIME: 0.16 SEC
BH CV ECHO MEAS - PA PR(ACCEL): 7.9 MMHG
BH CV ECHO MEAS - PA V2 MAX: 136 CM/SEC
BH CV ECHO MEAS - PULM A REVS DUR: 0.13 SEC
BH CV ECHO MEAS - PULM A REVS VEL: 95 CM/SEC
BH CV ECHO MEAS - PULM DIAS VEL: 45.7 CM/SEC
BH CV ECHO MEAS - PULM S/D: 1.5
BH CV ECHO MEAS - PULM SYS VEL: 68.4 CM/SEC
BH CV ECHO MEAS - QP/QS: 1.32
BH CV ECHO MEAS - RAP SYSTOLE: 3 MMHG
BH CV ECHO MEAS - RV MAX PG: 4.3 MMHG
BH CV ECHO MEAS - RV V1 MAX: 104 CM/SEC
BH CV ECHO MEAS - RV V1 VTI: 22 CM
BH CV ECHO MEAS - RVDD: 2.9 CM
BH CV ECHO MEAS - RVOT DIAM: 2.6 CM
BH CV ECHO MEAS - RVSP: 21.8 MMHG
BH CV ECHO MEAS - SI(MOD-SP4): 22.5 ML/M2
BH CV ECHO MEAS - SV(LVOT): 88.7 ML
BH CV ECHO MEAS - SV(MOD-SP4): 44.1 ML
BH CV ECHO MEAS - SV(RVOT): 116.8 ML
BH CV ECHO MEAS - TAPSE (>1.6): 2.13 CM
BH CV ECHO MEAS - TR MAX PG: 18.8 MMHG
BH CV ECHO MEAS - TR MAX VEL: 217 CM/SEC
BH CV ECHO MEASUREMENTS AVERAGE E/E' RATIO: 7.64
BH CV ECHO SHUNT ASSESSMENT PERFORMED (HIDDEN SCRIPTING): 1
BH CV XLRA - RV BASE: 2.9 CM
BH CV XLRA - RV LENGTH: 6.3 CM
BH CV XLRA - RV MID: 2.7 CM
BILIRUB UR QL STRIP: NEGATIVE
BUN SERPL-MCNC: 8 MG/DL (ref 6–20)
BUN/CREAT SERPL: 11.4 (ref 7–25)
CALCIUM SPEC-SCNC: 9.1 MG/DL (ref 8.6–10.5)
CHLORIDE SERPL-SCNC: 103 MMOL/L (ref 98–107)
CHOLEST SERPL-MCNC: 128 MG/DL (ref 0–200)
CLARITY UR: CLEAR
CO2 SERPL-SCNC: 28 MMOL/L (ref 22–29)
COLOR UR: YELLOW
CREAT SERPL-MCNC: 0.7 MG/DL (ref 0.57–1)
DEPRECATED RDW RBC AUTO: 42.4 FL (ref 37–54)
EGFRCR SERPLBLD CKD-EPI 2021: 110.2 ML/MIN/1.73
EOSINOPHIL # BLD AUTO: 0.2 10*3/MM3 (ref 0–0.4)
EOSINOPHIL NFR BLD AUTO: 3.1 % (ref 0.3–6.2)
ERYTHROCYTE [DISTWIDTH] IN BLOOD BY AUTOMATED COUNT: 12.5 % (ref 12.3–15.4)
GLUCOSE BLDC GLUCOMTR-MCNC: 83 MG/DL (ref 70–105)
GLUCOSE BLDC GLUCOMTR-MCNC: 86 MG/DL (ref 70–105)
GLUCOSE BLDC GLUCOMTR-MCNC: 90 MG/DL (ref 70–105)
GLUCOSE SERPL-MCNC: 102 MG/DL (ref 65–99)
GLUCOSE UR STRIP-MCNC: NEGATIVE MG/DL
HBA1C MFR BLD: 5.1 % (ref 4.8–5.6)
HCT VFR BLD AUTO: 39.4 % (ref 34–46.6)
HDLC SERPL-MCNC: 50 MG/DL (ref 40–60)
HGB BLD-MCNC: 13.4 G/DL (ref 12–15.9)
HGB UR QL STRIP.AUTO: NEGATIVE
KETONES UR QL STRIP: ABNORMAL
LDLC SERPL CALC-MCNC: 62 MG/DL (ref 0–100)
LDLC/HDLC SERPL: 1.23 {RATIO}
LEFT ATRIUM VOLUME INDEX: 24.2 ML/M2
LEUKOCYTE ESTERASE UR QL STRIP.AUTO: NEGATIVE
LYMPHOCYTES # BLD AUTO: 1.6 10*3/MM3 (ref 0.7–3.1)
LYMPHOCYTES NFR BLD AUTO: 21.4 % (ref 19.6–45.3)
MAGNESIUM SERPL-MCNC: 1.8 MG/DL (ref 1.6–2.6)
MAXIMAL PREDICTED HEART RATE: 177 BPM
MCH RBC QN AUTO: 31.1 PG (ref 26.6–33)
MCHC RBC AUTO-ENTMCNC: 33.9 G/DL (ref 31.5–35.7)
MCV RBC AUTO: 91.8 FL (ref 79–97)
MONOCYTES # BLD AUTO: 0.5 10*3/MM3 (ref 0.1–0.9)
MONOCYTES NFR BLD AUTO: 6.9 % (ref 5–12)
NEUTROPHILS NFR BLD AUTO: 5 10*3/MM3 (ref 1.7–7)
NEUTROPHILS NFR BLD AUTO: 68.1 % (ref 42.7–76)
NITRITE UR QL STRIP: NEGATIVE
NRBC BLD AUTO-RTO: 0.2 /100 WBC (ref 0–0.2)
PH UR STRIP.AUTO: 7 [PH] (ref 5–8)
PLATELET # BLD AUTO: 318 10*3/MM3 (ref 140–450)
PMV BLD AUTO: 8.1 FL (ref 6–12)
POTASSIUM SERPL-SCNC: 4.2 MMOL/L (ref 3.5–5.2)
PROT UR QL STRIP: NEGATIVE
QT INTERVAL: 420 MS
RBC # BLD AUTO: 4.29 10*6/MM3 (ref 3.77–5.28)
SINUS: 2.6 CM
SODIUM SERPL-SCNC: 140 MMOL/L (ref 136–145)
SP GR UR STRIP: 1.03 (ref 1–1.03)
STJ: 2.8 CM
STRESS TARGET HR: 150 BPM
TRIGL SERPL-MCNC: 83 MG/DL (ref 0–150)
TSH SERPL DL<=0.05 MIU/L-ACNC: 1.13 UIU/ML (ref 0.27–4.2)
UROBILINOGEN UR QL STRIP: ABNORMAL
VLDLC SERPL-MCNC: 16 MG/DL (ref 5–40)
WBC NRBC COR # BLD: 7.4 10*3/MM3 (ref 3.4–10.8)

## 2023-04-08 PROCEDURE — 97162 PT EVAL MOD COMPLEX 30 MIN: CPT

## 2023-04-08 PROCEDURE — G0378 HOSPITAL OBSERVATION PER HR: HCPCS

## 2023-04-08 PROCEDURE — 82962 GLUCOSE BLOOD TEST: CPT

## 2023-04-08 PROCEDURE — 83036 HEMOGLOBIN GLYCOSYLATED A1C: CPT | Performed by: INTERNAL MEDICINE

## 2023-04-08 PROCEDURE — 81003 URINALYSIS AUTO W/O SCOPE: CPT | Performed by: INTERNAL MEDICINE

## 2023-04-08 PROCEDURE — 72148 MRI LUMBAR SPINE W/O DYE: CPT

## 2023-04-08 PROCEDURE — 96361 HYDRATE IV INFUSION ADD-ON: CPT

## 2023-04-08 PROCEDURE — 85025 COMPLETE CBC W/AUTO DIFF WBC: CPT | Performed by: INTERNAL MEDICINE

## 2023-04-08 PROCEDURE — 80048 BASIC METABOLIC PNL TOTAL CA: CPT | Performed by: INTERNAL MEDICINE

## 2023-04-08 PROCEDURE — 84443 ASSAY THYROID STIM HORMONE: CPT | Performed by: INTERNAL MEDICINE

## 2023-04-08 PROCEDURE — 83735 ASSAY OF MAGNESIUM: CPT | Performed by: INTERNAL MEDICINE

## 2023-04-08 PROCEDURE — 93306 TTE W/DOPPLER COMPLETE: CPT

## 2023-04-08 PROCEDURE — 93306 TTE W/DOPPLER COMPLETE: CPT | Performed by: INTERNAL MEDICINE

## 2023-04-08 PROCEDURE — 36415 COLL VENOUS BLD VENIPUNCTURE: CPT | Performed by: INTERNAL MEDICINE

## 2023-04-08 PROCEDURE — 80061 LIPID PANEL: CPT | Performed by: INTERNAL MEDICINE

## 2023-04-08 RX ADMIN — SODIUM CHLORIDE 100 ML/HR: 9 INJECTION, SOLUTION INTRAVENOUS at 05:57

## 2023-04-08 RX ADMIN — ASPIRIN 325 MG ORAL TABLET 325 MG: 325 PILL ORAL at 10:46

## 2023-04-08 RX ADMIN — BUPROPION HYDROCHLORIDE 150 MG: 150 TABLET, EXTENDED RELEASE ORAL at 10:46

## 2023-04-08 RX ADMIN — LAMOTRIGINE 25 MG: 25 TABLET ORAL at 10:46

## 2023-04-08 RX ADMIN — Medication 10 ML: at 10:47

## 2023-04-08 RX ADMIN — LAMOTRIGINE 25 MG: 25 TABLET ORAL at 20:56

## 2023-04-08 RX ADMIN — SENNOSIDES AND DOCUSATE SODIUM 2 TABLET: 50; 8.6 TABLET ORAL at 10:47

## 2023-04-08 RX ADMIN — Medication 10 ML: at 20:56

## 2023-04-08 RX ADMIN — ACETAMINOPHEN 650 MG: 325 TABLET, FILM COATED ORAL at 20:56

## 2023-04-08 RX ADMIN — OXCARBAZEPINE 450 MG: 150 TABLET, FILM COATED ORAL at 10:47

## 2023-04-08 RX ADMIN — ATORVASTATIN CALCIUM 80 MG: 40 TABLET, FILM COATED ORAL at 20:56

## 2023-04-08 RX ADMIN — SENNOSIDES AND DOCUSATE SODIUM 2 TABLET: 50; 8.6 TABLET ORAL at 20:56

## 2023-04-08 RX ADMIN — OXCARBAZEPINE 450 MG: 150 TABLET, FILM COATED ORAL at 20:56

## 2023-04-08 RX ADMIN — LISINOPRIL 10 MG: 5 TABLET ORAL at 10:46

## 2023-04-08 RX ADMIN — ACETAMINOPHEN 650 MG: 325 TABLET, FILM COATED ORAL at 10:55

## 2023-04-08 NOTE — PLAN OF CARE
Problem: Adult Inpatient Plan of Care  Goal: Plan of Care Review  Outcome: Ongoing, Progressing  Flowsheets (Taken 4/8/2023 1448)  Progress: no change  Plan of Care Reviewed With: patient  Goal: Patient-Specific Goal (Individualized)  Outcome: Ongoing, Progressing  Goal: Absence of Hospital-Acquired Illness or Injury  Outcome: Ongoing, Progressing  Intervention: Identify and Manage Fall Risk  Recent Flowsheet Documentation  Taken 4/8/2023 1400 by Samantha Buck RN  Safety Promotion/Fall Prevention:   activity supervised   assistive device/personal items within reach   clutter free environment maintained   safety round/check completed  Taken 4/8/2023 1200 by Samantha Buck RN  Safety Promotion/Fall Prevention:   activity supervised   assistive device/personal items within reach   clutter free environment maintained   safety round/check completed  Taken 4/8/2023 0800 by Samantha Buck RN  Safety Promotion/Fall Prevention:   activity supervised   assistive device/personal items within reach   clutter free environment maintained   safety round/check completed  Intervention: Prevent Skin Injury  Recent Flowsheet Documentation  Taken 4/8/2023 1400 by Samantha Buck RN  Body Position: position changed independently  Taken 4/8/2023 1200 by Samantha Buck RN  Body Position: position changed independently  Skin Protection: adhesive use limited  Taken 4/8/2023 0800 by Samantha Buck RN  Body Position: position changed independently  Skin Protection: adhesive use limited  Intervention: Prevent and Manage VTE (Venous Thromboembolism) Risk  Recent Flowsheet Documentation  Taken 4/8/2023 1400 by Samantha Buck RN  Activity Management: ambulated to bathroom  Taken 4/8/2023 1200 by Samantha Buck RN  Activity Management: ambulated to bathroom  Taken 4/8/2023 0800 by Samantha uBck RN  Activity Management: activity encouraged  Range of Motion: active ROM (range of motion) encouraged  Intervention: Prevent Infection  Recent  Flowsheet Documentation  Taken 4/8/2023 1400 by Samantha Buck RN  Infection Prevention: hand hygiene promoted  Taken 4/8/2023 1200 by Samantha Buck RN  Infection Prevention: hand hygiene promoted  Taken 4/8/2023 0800 by Samantha Buck RN  Infection Prevention: hand hygiene promoted  Goal: Optimal Comfort and Wellbeing  Outcome: Ongoing, Progressing  Intervention: Provide Person-Centered Care  Recent Flowsheet Documentation  Taken 4/8/2023 1200 by Samantha Buck RN  Trust Relationship/Rapport:   care explained   choices provided   emotional support provided   questions answered   empathic listening provided   questions encouraged   thoughts/feelings acknowledged   reassurance provided  Goal: Readiness for Transition of Care  Outcome: Ongoing, Progressing     Problem: Adjustment to Illness (Stroke, Ischemic/Transient Ischemic Attack)  Goal: Optimal Coping  Outcome: Ongoing, Progressing  Intervention: Support Psychosocial Response to Stroke  Recent Flowsheet Documentation  Taken 4/8/2023 1200 by Samantha Buck RN  Supportive Measures: active listening utilized  Taken 4/8/2023 0800 by Samantha Buck RN  Supportive Measures: active listening utilized     Problem: Bowel Elimination Impaired (Stroke, Ischemic/Transient Ischemic Attack)  Goal: Effective Bowel Elimination  Outcome: Ongoing, Progressing  Intervention: Promote Effective Bowel Elimination  Recent Flowsheet Documentation  Taken 4/8/2023 1200 by Samantha Buck RN  Bowel Elimination Management: toileting offered  Bowel Program: maintenance program followed  Taken 4/8/2023 0800 by Samantha Buck RN  Bowel Elimination Management: toileting offered     Problem: Cerebral Tissue Perfusion (Stroke, Ischemic/Transient Ischemic Attack)  Goal: Optimal Cerebral Tissue Perfusion  Outcome: Ongoing, Progressing  Intervention: Protect and Optimize Cerebral Perfusion  Recent Flowsheet Documentation  Taken 4/8/2023 1400 by Samantha Buck RN  Stabilization Measures:  legs elevated  Taken 4/8/2023 1200 by Samantha Buck RN  Fluid/Electrolyte Management: fluids provided  Sensory Stimulation Regulation: care clustered  Cerebral Perfusion Promotion: blood pressure monitored  Taken 4/8/2023 0800 by Samantha Buck RN  Stabilization Measures: legs elevated  Fluid/Electrolyte Management: fluids provided  Sensory Stimulation Regulation: care clustered  Cerebral Perfusion Promotion: blood pressure monitored     Problem: Cognitive Impairment (Stroke, Ischemic/Transient Ischemic Attack)  Goal: Optimal Cognitive Function  Outcome: Ongoing, Progressing  Intervention: Optimize Cognitive Function  Recent Flowsheet Documentation  Taken 4/8/2023 1200 by Samantha Buck RN  Sensory Stimulation Regulation: care clustered  Reorientation Measures:   calendar in view   clock in view  Environment Familiarity/Consistency: daily routine followed  Taken 4/8/2023 0800 by Samantha Buck RN  Sensory Stimulation Regulation: care clustered  Reorientation Measures:   calendar in view   clock in view  Environment Familiarity/Consistency: daily routine followed     Problem: Communication Impairment (Stroke, Ischemic/Transient Ischemic Attack)  Goal: Improved Communication Skills  Outcome: Ongoing, Progressing  Intervention: Optimize Communication Skills  Recent Flowsheet Documentation  Taken 4/8/2023 1200 by Samantha Buck RN  Communication Enhancement Strategies: call light answered in person  Taken 4/8/2023 0800 by Samantha Buck RN  Communication Enhancement Strategies: call light answered in person     Problem: Functional Ability Impaired (Stroke, Ischemic/Transient Ischemic Attack)  Goal: Optimal Functional Ability  Outcome: Ongoing, Progressing  Intervention: Optimize Functional Ability  Recent Flowsheet Documentation  Taken 4/8/2023 1400 by Samantha Buck RN  Activity Management: ambulated to bathroom  Self-Care Promotion: independence encouraged  Taken 4/8/2023 1200 by Samantha Buck RN  Activity  Management: ambulated to bathroom  Self-Care Promotion: independence encouraged  Taken 4/8/2023 0800 by Samantha Buck RN  Activity Management: activity encouraged  Self-Care Promotion: independence encouraged     Problem: Respiratory Compromise (Stroke, Ischemic/Transient Ischemic Attack)  Goal: Effective Oxygenation and Ventilation  Outcome: Ongoing, Progressing  Intervention: Optimize Oxygenation and Ventilation  Recent Flowsheet Documentation  Taken 4/8/2023 1400 by Samantha Buck RN  Head of Bed (HOB) Positioning:   HOB elevated   HOB at 30-45 degrees  Taken 4/8/2023 1200 by Samantha Buck RN  Head of Bed (HOB) Positioning:   HOB elevated   HOB at 30-45 degrees  Airway/Ventilation Management: airway patency maintained  Taken 4/8/2023 0800 by Samantha Buck RN  Head of Bed (HOB) Positioning:   HOB elevated   HOB at 30-45 degrees  Airway/Ventilation Management: airway patency maintained     Problem: Sensorimotor Impairment (Stroke, Ischemic/Transient Ischemic Attack)  Goal: Improved Sensorimotor Function  Outcome: Ongoing, Progressing  Intervention: Optimize Range of Motion, Motor Control and Function  Recent Flowsheet Documentation  Taken 4/8/2023 1400 by Samantha Buck RN  Positioning/Transfer Devices:   pillows   in use  Taken 4/8/2023 1200 by Samantha Buck RN  Spasticity Management: weight-bearing facilitated  Positioning/Transfer Devices:   pillows   in use  Taken 4/8/2023 0800 by Samantha Buck RN  Spasticity Management: weight-bearing facilitated  Positioning/Transfer Devices:   pillows   in use  Range of Motion: active ROM (range of motion) encouraged  Intervention: Optimize Sensory and Perceptual Ability  Recent Flowsheet Documentation  Taken 4/8/2023 1200 by Samantha Buck RN  Pressure Reduction Techniques: frequent weight shift encouraged  Sensation Impairment Protection: cues provided for safety  Pressure Reduction Devices: positioning supports utilized  Taken 4/8/2023 0800 by Samantha Buck  RN  Pressure Reduction Techniques: frequent weight shift encouraged  Pressure Reduction Devices: positioning supports utilized     Problem: Swallowing Impairment (Stroke, Ischemic/Transient Ischemic Attack)  Goal: Optimal Eating and Swallowing without Aspiration  Outcome: Ongoing, Progressing  Intervention: Optimize Eating and Swallowing  Recent Flowsheet Documentation  Taken 4/8/2023 1400 by Samantha Buck RN  Aspiration Precautions: awake/alert before oral intake  Taken 4/8/2023 1200 by Samantha Buck RN  Aspiration Precautions: awake/alert before oral intake  Swallowing Interventions: Dysphagia: upright position maintained 30 mins after intake  Taken 4/8/2023 0800 by aSmantha Buck RN  Aspiration Precautions: awake/alert before oral intake     Problem: Urinary Elimination Impaired (Stroke, Ischemic/Transient Ischemic Attack)  Goal: Effective Urinary Elimination  Outcome: Ongoing, Progressing  Intervention: Promote Effective Bladder Elimination  Recent Flowsheet Documentation  Taken 4/8/2023 1200 by Samantha Buck RN  Urinary Elimination Promotion: toileting offered  Taken 4/8/2023 0800 by Samantha Buck RN  Urinary Elimination Promotion: toileting offered     Problem: Fall Injury Risk  Goal: Absence of Fall and Fall-Related Injury  Outcome: Ongoing, Progressing  Intervention: Identify and Manage Contributors  Recent Flowsheet Documentation  Taken 4/8/2023 1400 by Samantha Buck RN  Medication Review/Management: medications reviewed  Self-Care Promotion: independence encouraged  Taken 4/8/2023 1200 by Samantha Buck RN  Medication Review/Management: medications reviewed  Self-Care Promotion: independence encouraged  Taken 4/8/2023 0800 by Samantha Buck RN  Medication Review/Management: medications reviewed  Self-Care Promotion: independence encouraged  Intervention: Promote Injury-Free Environment  Recent Flowsheet Documentation  Taken 4/8/2023 1400 by Samantha Buck RN  Safety Promotion/Fall  Prevention:   activity supervised   assistive device/personal items within reach   clutter free environment maintained   safety round/check completed  Taken 4/8/2023 1200 by Samantha Buck, RN  Safety Promotion/Fall Prevention:   activity supervised   assistive device/personal items within reach   clutter free environment maintained   safety round/check completed  Taken 4/8/2023 0800 by Samantha Buck, RN  Safety Promotion/Fall Prevention:   activity supervised   assistive device/personal items within reach   clutter free environment maintained   safety round/check completed   Goal Outcome Evaluation:  Plan of Care Reviewed With: patient        Progress: no change     Patient v/s stable throughout shift, patient went down for MRI of lumbar spine, results are in and were sent to Dr. Macedo,  patient has been using rolling walker to get to bathroom and back to bed.  No family called or at bedside to update on events and procedures.

## 2023-04-08 NOTE — PROGRESS NOTES
[facility] Medicine Services Daily Progress Note    Patient Name: Lexis Hernandez  : 1980  MRN: [id number]  Primary Care Physician:  Nataly Barr APRN  Date of admission: 2023      Subjective      Chief Complaint: Left lower extremity weakness.      Patient Reports:      2023.  Patient was seen and examined.  Patient reported slight improvement in her symptoms.    Review of Systems   Constitutional: Positive for malaise/fatigue.   HENT: Negative.    Eyes: Negative.    Cardiovascular: Negative.    Respiratory: Negative.    Endocrine: Negative.    Hematologic/Lymphatic: Negative.    Skin: Negative.    Musculoskeletal: Negative.    Gastrointestinal: Negative.    Genitourinary: Negative.    Neurological: Negative.    Psychiatric/Behavioral: Negative.    Allergic/Immunologic: Negative.             Objective      Vitals:   Temp:  [97.4 °F (36.3 °C)-98.8 °F (37.1 °C)] 98.1 °F (36.7 °C)  Heart Rate:  [56-77] 77  Resp:  [13-18] 15  BP: (104-133)/(61-77) 133/68    Physical Exam  Vitals reviewed.   Constitutional:       General: She is not in acute distress.  HENT:      Head: Normocephalic and atraumatic.      Nose: Nose normal. No congestion or rhinorrhea.      Mouth/Throat:      Mouth: Mucous membranes are moist.      Pharynx: Oropharynx is clear. No oropharyngeal exudate or posterior oropharyngeal erythema.   Eyes:      Pupils: Pupils are equal, round, and reactive to light.   Cardiovascular:      Pulses: Normal pulses.      Heart sounds: Normal heart sounds. No murmur heard.    No friction rub. No gallop.      Comments: S1 and S2 present.  No tachycardia.  Pulmonary:      Effort: No respiratory distress.      Breath sounds: No wheezing, rhonchi or rales.   Chest:      Chest wall: No tenderness.   Abdominal:      General: Abdomen is flat. Bowel sounds are normal. There is no distension.      Palpations: Abdomen is soft.      Tenderness: There is no abdominal tenderness. There  is no right CVA tenderness or guarding.   Musculoskeletal:         General: No swelling, tenderness, deformity or signs of injury.      Cervical back: Neck supple. No tenderness.      Right lower leg: No edema.      Left lower leg: No edema.   Skin:     Capillary Refill: Capillary refill takes less than 2 seconds.      Coloration: Skin is not jaundiced.      Findings: No bruising, lesion or rash.   Neurological:      Mental Status: She is alert.      Comments: No facial asymmetry noted.  Gait and station not tested.   Psychiatric:      Comments: No agitation.               Result Review    Result Review:  I have personally reviewed the results from the time of this admission to 4/8/2023 17:33 EDT and agree with these findings:  [x]  Laboratory  [x]  Microbiology  [x]  Radiology  []  EKG/Telemetry   []  Cardiology/Vascular   []  Pathology  []  Old records  []  Other:  Most notable findings include:       Procedure Component Value Units Date/Time    MRI Lumbar Spine Without Contrast [[id number]] Isra as Reviewed   Order Status: Completed Collected: 04/08/23 1228    Updated: 04/08/23 1243   Narrative:     MRI LUMBAR SPINE WO CONTRAST     Date of Exam: 4/8/2023 11:20 AM EDT     Indication: Ataxia or coordination problem (Ped 0-17y).       Comparison: None available.     Technique:  Routine multiplanar/multisequence sequence images of the lumbar spine were obtained without contrast administration.       Findings:   T12-L1: No disc protrusion or extrusion. No central canal or foraminal stenosis     L-1-L2: Left paracentral/foraminal protrusion. No central nerve root compression. Mild left foraminal stenosis     L2-L3: Mild diffuse disc bulge. No significant central canal or foraminal stenosis. Focal T2 signal in the left extra foraminal annulus compatible with annular fissure. Incidental bilateral nerve root sleeve cysts     L3-L4: Diffuse disc bulge. Bilateral facet arthropathy. Mild central canal stenosis. Mild bilateral  foraminal stenosis. Focal T2 signal in the left extraforaminal annulus compatible with annular fissure. Incidental bilateral nerve root sleeve cysts     L4-L5: Mild diffuse disc bulge. Bilateral facet arthropathy resulting in slight L4 anterolisthesis. No significant central canal stenosis. Moderate bilateral foraminal stenosis. Focal T2 signal in the left extraforaminal annulus compatible with annular   fissure. Incidental bilateral nerve root sleeve cysts     L5-S1: Broad-based central/left paracentral/left foraminal disc bulge. Left facet arthropathy. The bulging disc contacts the left S1 nerve root as it exits the thecal sac without obvious compression. No thecal sac compression. Moderate left foraminal   stenosis. Incidental right-sided nerve root sleeve cyst     Conus normal in appearance terminating at L1-L2. No abnormality of the cauda equina. Normal generalized marrow signal. Incidental sacral Tarlov cysts. No paraspinal mass or fluid collection. Aorta normal in caliber    Impression:     Impression:   Multilevel lumbar degenerative disease     Electronically Signed: Manish Ballard    4/8/2023 12:41 PM EDT                Assessment & Plan    From previous notes and with minor updates.    Brief Patient Summary:    Patient is a 42-year-old female with past medical history of obesity, trigeminal neuralgia, osteoarthritis of the knees status post left knee replacement and a primary hypertension who presented to the emergency room because of a sudden onset of a neurologic symptoms.  Patient reported that her symptoms started around 0800 on the day of presentation with right-sided weakness and then shifted to left-sided weakness associated with headache, dizziness, gait abnormality including dragging of her feet.  It was also noted that patient had some numbness on the right side before switching to the left.  The  noted that she started having a frontal headache before the headache became diffused.   Patient reported improvement in her symptoms at the time she was seen but still having difficulties with gait.  A code stroke was called in the emergency room and neurology consult was completed.  CT scan of the head and brain and MRI were completed and were negative.  CT angiogram of the head and the neck were completed and did not show any major vessel stenosis, thrombosis or plaques.           aspirin, 325 mg, Oral, Daily   Or  aspirin, 300 mg, Rectal, Daily  atorvastatin, 80 mg, Oral, Nightly  buPROPion XL, 150 mg, Oral, Daily  lamoTRIgine, 25 mg, Oral, BID  lisinopril, 10 mg, Oral, Daily  OXcarbazepine, 450 mg, Oral, BID  senna-docusate sodium, 2 tablet, Oral, BID  sodium chloride, 10 mL, Intravenous, Q12H  sodium chloride, 10 mL, Intravenous, Q12H       sodium chloride, 100 mL/hr, Last Rate: 100 mL/hr (04/08/23 1048)         Active Hospital Problems:  Active Hospital Problems    Diagnosis    • **Left-sided weakness    • Vertebral bodies impingement syndrome    • Bulging lumbar disc    • Right sided weakness    • Tension type headache    • Trigeminal neuralgia    • Primary hypertension    • Primary osteoarthritis      Plan:-Continue appropriate patient's home medications for the chronic medical conditions.  -Continue the present level of care.  -Patient and family agreed with the plan of care.  -Completed neurosurgery consulted because of a bulging disc in L5-S1 causing possible impingement syndrome.      DVT prophylaxis:  Mechanical DVT prophylaxis orders are present.    CODE STATUS:    Level Of Support Discussed With: Patient  Code Status (Patient has no pulse and is not breathing): CPR (Attempt to Resuscitate)  Medical Interventions (Patient has pulse or is breathing): Full Support  Release to patient: Routine Release      Disposition: This wonderful patient can discharge in the next 24 to 48 hours.    This patient has been examined wearing appropriate Personal Protective Equipment and discussed with  [facility] infection control department, [facility] department, infectious disease specialist and pulmonologist. 04/08/23      Electronically signed by Jc Macedo MD, FACP, 04/08/23, 17:33 EDT.      [facility] Hospitalist Team

## 2023-04-08 NOTE — PLAN OF CARE
Goal Outcome Evaluation:         Orders received for speech evaluation. Based on imaging results patient did not exhibit any acute abnormalities. Patient passed swallow screen and currently on a diet. .  Patient is at baseline. Therefore, ST will s/o from caseload at this time. Please contact this department if any further needs arise.

## 2023-04-08 NOTE — PLAN OF CARE
Problem: Adult Inpatient Plan of Care  Goal: Plan of Care Review  Outcome: Ongoing, Progressing  Flowsheets (Taken 4/7/2023 2217)  Progress: no change  Plan of Care Reviewed With: patient  Outcome Evaluation: new admit  Goal: Patient-Specific Goal (Individualized)  Outcome: Ongoing, Progressing  Goal: Absence of Hospital-Acquired Illness or Injury  Outcome: Ongoing, Progressing  Goal: Optimal Comfort and Wellbeing  Outcome: Ongoing, Progressing  Goal: Readiness for Transition of Care  Outcome: Ongoing, Progressing     Problem: Adjustment to Illness (Stroke, Ischemic/Transient Ischemic Attack)  Goal: Optimal Coping  Outcome: Ongoing, Progressing     Problem: Bowel Elimination Impaired (Stroke, Ischemic/Transient Ischemic Attack)  Goal: Effective Bowel Elimination  Outcome: Ongoing, Progressing     Problem: Cerebral Tissue Perfusion (Stroke, Ischemic/Transient Ischemic Attack)  Goal: Optimal Cerebral Tissue Perfusion  Outcome: Ongoing, Progressing     Problem: Cognitive Impairment (Stroke, Ischemic/Transient Ischemic Attack)  Goal: Optimal Cognitive Function  Outcome: Ongoing, Progressing     Problem: Communication Impairment (Stroke, Ischemic/Transient Ischemic Attack)  Goal: Improved Communication Skills  Outcome: Ongoing, Progressing     Problem: Functional Ability Impaired (Stroke, Ischemic/Transient Ischemic Attack)  Goal: Optimal Functional Ability  Outcome: Ongoing, Progressing     Problem: Respiratory Compromise (Stroke, Ischemic/Transient Ischemic Attack)  Goal: Effective Oxygenation and Ventilation  Outcome: Ongoing, Progressing     Problem: Sensorimotor Impairment (Stroke, Ischemic/Transient Ischemic Attack)  Goal: Improved Sensorimotor Function  Outcome: Ongoing, Progressing     Problem: Swallowing Impairment (Stroke, Ischemic/Transient Ischemic Attack)  Goal: Optimal Eating and Swallowing without Aspiration  Outcome: Ongoing, Progressing     Problem: Urinary Elimination Impaired (Stroke,  Ischemic/Transient Ischemic Attack)  Goal: Effective Urinary Elimination  Outcome: Ongoing, Progressing     Problem: Fall Injury Risk  Goal: Absence of Fall and Fall-Related Injury  Outcome: Ongoing, Progressing   Goal Outcome Evaluation:  Plan of Care Reviewed With: patient        Progress: no change  Outcome Evaluation: new admit

## 2023-04-09 VITALS
OXYGEN SATURATION: 98 % | TEMPERATURE: 98.8 F | BODY MASS INDEX: 34.75 KG/M2 | SYSTOLIC BLOOD PRESSURE: 108 MMHG | RESPIRATION RATE: 21 BRPM | HEIGHT: 65 IN | HEART RATE: 66 BPM | DIASTOLIC BLOOD PRESSURE: 72 MMHG | WEIGHT: 208.56 LBS

## 2023-04-09 PROBLEM — M54.16 ACUTE LUMBAR RADICULOPATHY: Status: ACTIVE | Noted: 2023-04-09

## 2023-04-09 LAB
ANION GAP SERPL CALCULATED.3IONS-SCNC: 6 MMOL/L (ref 5–15)
BASOPHILS # BLD AUTO: 0 10*3/MM3 (ref 0–0.2)
BASOPHILS NFR BLD AUTO: 0.6 % (ref 0–1.5)
BUN SERPL-MCNC: 8 MG/DL (ref 6–20)
BUN/CREAT SERPL: 12.7 (ref 7–25)
CALCIUM SPEC-SCNC: 8.6 MG/DL (ref 8.6–10.5)
CHLORIDE SERPL-SCNC: 103 MMOL/L (ref 98–107)
CO2 SERPL-SCNC: 29 MMOL/L (ref 22–29)
CREAT SERPL-MCNC: 0.63 MG/DL (ref 0.57–1)
DEPRECATED RDW RBC AUTO: 42 FL (ref 37–54)
EGFRCR SERPLBLD CKD-EPI 2021: 113 ML/MIN/1.73
EOSINOPHIL # BLD AUTO: 0.3 10*3/MM3 (ref 0–0.4)
EOSINOPHIL NFR BLD AUTO: 3.7 % (ref 0.3–6.2)
ERYTHROCYTE [DISTWIDTH] IN BLOOD BY AUTOMATED COUNT: 12.4 % (ref 12.3–15.4)
GLUCOSE BLDC GLUCOMTR-MCNC: 108 MG/DL (ref 70–105)
GLUCOSE BLDC GLUCOMTR-MCNC: 84 MG/DL (ref 70–105)
GLUCOSE BLDC GLUCOMTR-MCNC: 94 MG/DL (ref 70–105)
GLUCOSE SERPL-MCNC: 113 MG/DL (ref 65–99)
HCT VFR BLD AUTO: 38.9 % (ref 34–46.6)
HGB BLD-MCNC: 13.2 G/DL (ref 12–15.9)
LYMPHOCYTES # BLD AUTO: 2 10*3/MM3 (ref 0.7–3.1)
LYMPHOCYTES NFR BLD AUTO: 23.6 % (ref 19.6–45.3)
MAGNESIUM SERPL-MCNC: 1.8 MG/DL (ref 1.6–2.6)
MCH RBC QN AUTO: 31.4 PG (ref 26.6–33)
MCHC RBC AUTO-ENTMCNC: 34 G/DL (ref 31.5–35.7)
MCV RBC AUTO: 92.2 FL (ref 79–97)
MONOCYTES # BLD AUTO: 0.8 10*3/MM3 (ref 0.1–0.9)
MONOCYTES NFR BLD AUTO: 8.9 % (ref 5–12)
NEUTROPHILS NFR BLD AUTO: 5.4 10*3/MM3 (ref 1.7–7)
NEUTROPHILS NFR BLD AUTO: 63.2 % (ref 42.7–76)
NRBC BLD AUTO-RTO: 0 /100 WBC (ref 0–0.2)
PLATELET # BLD AUTO: 283 10*3/MM3 (ref 140–450)
PMV BLD AUTO: 7.8 FL (ref 6–12)
POTASSIUM SERPL-SCNC: 4 MMOL/L (ref 3.5–5.2)
RBC # BLD AUTO: 4.22 10*6/MM3 (ref 3.77–5.28)
SODIUM SERPL-SCNC: 138 MMOL/L (ref 136–145)
WBC NRBC COR # BLD: 8.5 10*3/MM3 (ref 3.4–10.8)

## 2023-04-09 PROCEDURE — 36415 COLL VENOUS BLD VENIPUNCTURE: CPT | Performed by: INTERNAL MEDICINE

## 2023-04-09 PROCEDURE — 85025 COMPLETE CBC W/AUTO DIFF WBC: CPT | Performed by: INTERNAL MEDICINE

## 2023-04-09 PROCEDURE — G0378 HOSPITAL OBSERVATION PER HR: HCPCS

## 2023-04-09 PROCEDURE — 83735 ASSAY OF MAGNESIUM: CPT | Performed by: INTERNAL MEDICINE

## 2023-04-09 PROCEDURE — 82962 GLUCOSE BLOOD TEST: CPT

## 2023-04-09 PROCEDURE — 80048 BASIC METABOLIC PNL TOTAL CA: CPT | Performed by: INTERNAL MEDICINE

## 2023-04-09 PROCEDURE — 99222 1ST HOSP IP/OBS MODERATE 55: CPT

## 2023-04-09 RX ORDER — CYCLOBENZAPRINE HCL 10 MG
10 TABLET ORAL 3 TIMES DAILY PRN
Qty: 21 TABLET | Refills: 0 | Status: SHIPPED | OUTPATIENT
Start: 2023-04-09 | End: 2023-04-16

## 2023-04-09 RX ORDER — CALCIUM CARBONATE 200(500)MG
2 TABLET,CHEWABLE ORAL 3 TIMES DAILY PRN
Status: DISCONTINUED | OUTPATIENT
Start: 2023-04-09 | End: 2023-04-09 | Stop reason: HOSPADM

## 2023-04-09 RX ORDER — OXYCODONE HYDROCHLORIDE 5 MG/1
5 TABLET ORAL EVERY 8 HOURS PRN
Qty: 21 TABLET | Refills: 0 | Status: SHIPPED | OUTPATIENT
Start: 2023-04-09 | End: 2023-04-16

## 2023-04-09 RX ADMIN — SENNOSIDES AND DOCUSATE SODIUM 2 TABLET: 50; 8.6 TABLET ORAL at 08:23

## 2023-04-09 RX ADMIN — OXYCODONE 5 MG: 5 TABLET ORAL at 04:07

## 2023-04-09 RX ADMIN — Medication 10 ML: at 08:25

## 2023-04-09 RX ADMIN — LISINOPRIL 10 MG: 5 TABLET ORAL at 08:22

## 2023-04-09 RX ADMIN — OXCARBAZEPINE 450 MG: 150 TABLET, FILM COATED ORAL at 08:23

## 2023-04-09 RX ADMIN — LAMOTRIGINE 25 MG: 25 TABLET ORAL at 08:23

## 2023-04-09 RX ADMIN — CALCIUM CARBONATE 2 TABLET: 500 TABLET, CHEWABLE ORAL at 05:21

## 2023-04-09 RX ADMIN — ACETAMINOPHEN 650 MG: 325 TABLET, FILM COATED ORAL at 13:39

## 2023-04-09 RX ADMIN — Medication 10 ML: at 08:26

## 2023-04-09 RX ADMIN — ASPIRIN 325 MG ORAL TABLET 325 MG: 325 PILL ORAL at 08:23

## 2023-04-09 RX ADMIN — BUPROPION HYDROCHLORIDE 150 MG: 150 TABLET, EXTENDED RELEASE ORAL at 08:23

## 2023-04-09 NOTE — DISCHARGE INSTRUCTIONS
Neurosurgery saw patient, no surgical intervention, will sign off                                                                                                       - No emergent neurosurgical intervention necessary at this time  - Outpatient referral to physical therapy upon discharge  - Outpatient referral to pain management for left L5 transforaminal injection  - Follow-up with me in outpatient neurosurgery clinic after completing PT and getting injections.    Patient was also advised to follow-up with her primary care physician who will review her current medications.    Patient was advised to follow-up with her neurologist who will reassess her neuro function.    Patient was advised to return to the emergency department if she experiences any recurrence of her symptoms.

## 2023-04-09 NOTE — PLAN OF CARE
Goal Outcome Evaluation:  Plan of Care Reviewed With: patient        Progress: improving  Outcome Evaluation: Patient 43 yo female admitted to the hospital with the complaint of dizziness and left side weakness. CT head reviewed and normal. CTA did not show any significant large vessel occlusion. MRI brain showed no acute abnormality. Recent Dx includes Left-sided weakness. At baseline, patient resides with spouse in a house and is independent in mobility and ADLs without using AD. As per today's evaluation, patient required SBA for Bed Mob, Patient attempted twice for transfers as her L knee buckled in first attempt and required Min A and RW for transfers and ambulation of around 16ft. Pt will continue to benefit from skilled PT intervention during stay at [facility] and current recommendation is home with HHPT unless pt continues to decline during stay.

## 2023-04-09 NOTE — PLAN OF CARE
Problem: Adult Inpatient Plan of Care  Goal: Plan of Care Review  Outcome: Ongoing, Progressing  Flowsheets (Taken 4/9/2023 0224)  Progress: improving  Plan of Care Reviewed With:   patient   family  Goal: Patient-Specific Goal (Individualized)  Outcome: Ongoing, Progressing  Goal: Absence of Hospital-Acquired Illness or Injury  Outcome: Ongoing, Progressing  Intervention: Identify and Manage Fall Risk  Recent Flowsheet Documentation  Taken 4/9/2023 0000 by Kerrie Villaseñor RN  Safety Promotion/Fall Prevention:   activity supervised   assistive device/personal items within reach   clutter free environment maintained   fall prevention program maintained   lighting adjusted   nonskid shoes/slippers when out of bed   room organization consistent   safety round/check completed  Taken 4/8/2023 2200 by Kerrie Villaseñor RN  Safety Promotion/Fall Prevention:   activity supervised   assistive device/personal items within reach   clutter free environment maintained   fall prevention program maintained   lighting adjusted   nonskid shoes/slippers when out of bed   room organization consistent   safety round/check completed  Taken 4/8/2023 2000 by Kerrie Villaseñor RN  Safety Promotion/Fall Prevention:   activity supervised   assistive device/personal items within reach   clutter free environment maintained   fall prevention program maintained   lighting adjusted   nonskid shoes/slippers when out of bed   room organization consistent   safety round/check completed  Intervention: Prevent Skin Injury  Recent Flowsheet Documentation  Taken 4/9/2023 0000 by Kerrie Villaseñor RN  Body Position: position changed independently  Skin Protection:   adhesive use limited   tubing/devices free from skin contact  Taken 4/8/2023 2200 by Kerrie Villaseñor RN  Body Position: position changed independently  Taken 4/8/2023 2000 by Kerrie Villaseñor RN  Body Position: position changed independently  Skin Protection:   adhesive use limited    tubing/devices free from skin contact  Intervention: Prevent and Manage VTE (Venous Thromboembolism) Risk  Recent Flowsheet Documentation  Taken 4/9/2023 0000 by Kerrie Villaseñor RN  Activity Management: activity encouraged  VTE Prevention/Management:   bilateral   sequential compression devices on  Range of Motion:   active ROM (range of motion) encouraged   ROM (range of motion) performed  Taken 4/8/2023 2200 by Kerrie Villaseñor, RN  Activity Management: activity encouraged  Taken 4/8/2023 2000 by Kerrie Villaseñor RN  Activity Management: activity encouraged  VTE Prevention/Management:   bilateral   sequential compression devices off  Range of Motion:   ROM (range of motion) performed   active ROM (range of motion) encouraged  Intervention: Prevent Infection  Recent Flowsheet Documentation  Taken 4/9/2023 0000 by Kerrie Villaseñor RN  Infection Prevention:   environmental surveillance performed   equipment surfaces disinfected   hand hygiene promoted   personal protective equipment utilized   rest/sleep promoted   single patient room provided  Taken 4/8/2023 2200 by Kerrie Villaseñor RN  Infection Prevention:   environmental surveillance performed   equipment surfaces disinfected   hand hygiene promoted   personal protective equipment utilized   rest/sleep promoted   single patient room provided  Taken 4/8/2023 2000 by Kerrie Villaseñor RN  Infection Prevention:   environmental surveillance performed   equipment surfaces disinfected   hand hygiene promoted   personal protective equipment utilized   rest/sleep promoted   single patient room provided  Goal: Optimal Comfort and Wellbeing  Outcome: Ongoing, Progressing  Intervention: Monitor Pain and Promote Comfort  Recent Flowsheet Documentation  Taken 4/8/2023 2056 by Kerrie Villaseñor RN  Pain Management Interventions:   care clustered   pillow support provided   position adjusted   quiet environment facilitated   see MAR  Intervention: Provide Person-Centered  Care  Recent Flowsheet Documentation  Taken 4/9/2023 0000 by Kerrie Villaseñor RN  Trust Relationship/Rapport:   care explained   choices provided   emotional support provided   empathic listening provided   questions answered   questions encouraged   reassurance provided   thoughts/feelings acknowledged  Taken 4/8/2023 2000 by Kerrie Villaseñor RN  Trust Relationship/Rapport:   care explained   choices provided   emotional support provided   empathic listening provided   questions encouraged   questions answered   reassurance provided   thoughts/feelings acknowledged  Goal: Readiness for Transition of Care  Outcome: Ongoing, Progressing     Problem: Adjustment to Illness (Stroke, Ischemic/Transient Ischemic Attack)  Goal: Optimal Coping  Outcome: Ongoing, Progressing  Intervention: Support Psychosocial Response to Stroke  Recent Flowsheet Documentation  Taken 4/9/2023 0000 by Kerrie Villaseñor RN  Supportive Measures:   active listening utilized   positive reinforcement provided   self-care encouraged  Family/Support System Care: self-care encouraged  Taken 4/8/2023 2000 by Kerrie Villaseñor RN  Supportive Measures:   active listening utilized   positive reinforcement provided   self-care encouraged  Family/Support System Care:   involvement promoted   presence promoted   self-care encouraged   support provided     Problem: Bowel Elimination Impaired (Stroke, Ischemic/Transient Ischemic Attack)  Goal: Effective Bowel Elimination  Outcome: Ongoing, Progressing  Intervention: Promote Effective Bowel Elimination  Recent Flowsheet Documentation  Taken 4/9/2023 0000 by Kerrie Villaseñor RN  Bowel Elimination Management: toileting offered  Taken 4/8/2023 2000 by Kerrie Villaseñor RN  Bowel Elimination Management: toileting offered     Problem: Cerebral Tissue Perfusion (Stroke, Ischemic/Transient Ischemic Attack)  Goal: Optimal Cerebral Tissue Perfusion  Outcome: Ongoing, Progressing  Intervention: Protect and Optimize  Cerebral Perfusion  Recent Flowsheet Documentation  Taken 4/9/2023 0000 by Kerrie Villaseñor RN  Stabilization Measures: legs elevated  Fluid/Electrolyte Management: fluids provided  Sensory Stimulation Regulation:   care clustered   lighting decreased  Cerebral Perfusion Promotion: blood pressure monitored  Taken 4/8/2023 2200 by Kerrie Villaseñor RN  Stabilization Measures: legs elevated  Taken 4/8/2023 2000 by Kerrie Villaseñor RN  Stabilization Measures: legs elevated  Fluid/Electrolyte Management: fluids provided  Sensory Stimulation Regulation:   care clustered   lighting decreased  Cerebral Perfusion Promotion: blood pressure monitored     Problem: Cognitive Impairment (Stroke, Ischemic/Transient Ischemic Attack)  Goal: Optimal Cognitive Function  Outcome: Ongoing, Progressing  Intervention: Optimize Cognitive Function  Recent Flowsheet Documentation  Taken 4/9/2023 0000 by Kerrie Villaseñor RN  Sensory Stimulation Regulation:   care clustered   lighting decreased  Reorientation Measures:   calendar in view   clock in view  Environment Familiarity/Consistency:   daily routine followed   familiar objects from home provided   personal clothing/items utilized  Taken 4/8/2023 2000 by Kerrie Villaseñor RN  Sensory Stimulation Regulation:   care clustered   lighting decreased  Reorientation Measures:   calendar in view   clock in view  Environment Familiarity/Consistency:   daily routine followed   familiar objects from home provided   personal clothing/items utilized     Problem: Communication Impairment (Stroke, Ischemic/Transient Ischemic Attack)  Goal: Improved Communication Skills  Outcome: Ongoing, Progressing  Intervention: Optimize Communication Skills  Recent Flowsheet Documentation  Taken 4/9/2023 0000 by Kerrie Villaseñor RN  Communication Enhancement Strategies: call light answered in person  Taken 4/8/2023 2000 by Kerrie Villaseñor RN  Communication Enhancement Strategies: call light answered in  person     Problem: Functional Ability Impaired (Stroke, Ischemic/Transient Ischemic Attack)  Goal: Optimal Functional Ability  Outcome: Ongoing, Progressing  Intervention: Optimize Functional Ability  Recent Flowsheet Documentation  Taken 4/9/2023 0000 by Kerrie Villaseñor RN  Activity Management: activity encouraged  Self-Care Promotion:   independence encouraged   BADL personal objects within reach   BADL personal routines maintained  Taken 4/8/2023 2200 by Kerrie Villaseñor RN  Activity Management: activity encouraged  Self-Care Promotion:   independence encouraged   BADL personal objects within reach   BADL personal routines maintained  Taken 4/8/2023 2000 by Kerrie Villaseñor RN  Activity Management: activity encouraged  Self-Care Promotion:   independence encouraged   BADL personal objects within reach   BADL personal routines maintained     Problem: Respiratory Compromise (Stroke, Ischemic/Transient Ischemic Attack)  Goal: Effective Oxygenation and Ventilation  Outcome: Ongoing, Progressing  Intervention: Optimize Oxygenation and Ventilation  Recent Flowsheet Documentation  Taken 4/9/2023 0000 by Kerrie Villaseñor RN  Head of Bed (HOB) Positioning: HOB at 20-30 degrees  Airway/Ventilation Management:   airway patency maintained   calming measures promoted   pulmonary hygiene promoted  Taken 4/8/2023 2200 by Kerrie Villaseñor RN  Head of Bed (HOB) Positioning: HOB at 30-45 degrees  Taken 4/8/2023 2000 by Kerrie Villaseñor RN  Head of Bed (HOB) Positioning: HOB at 30-45 degrees  Airway/Ventilation Management:   airway patency maintained   calming measures promoted   pulmonary hygiene promoted     Problem: Sensorimotor Impairment (Stroke, Ischemic/Transient Ischemic Attack)  Goal: Improved Sensorimotor Function  Outcome: Ongoing, Progressing  Intervention: Optimize Range of Motion, Motor Control and Function  Recent Flowsheet Documentation  Taken 4/9/2023 0000 by Kerrie Villaseñor RN  Spasticity Management:  weight-bearing facilitated  Positioning/Transfer Devices:   pillows   in use  Range of Motion:   active ROM (range of motion) encouraged   ROM (range of motion) performed  Taken 4/8/2023 2200 by Kerrie Villaseñor RN  Positioning/Transfer Devices:   pillows   in use  Taken 4/8/2023 2000 by Kerrie Villaseñor RN  Spasticity Management: weight-bearing facilitated  Positioning/Transfer Devices:   pillows   in use  Range of Motion:   ROM (range of motion) performed   active ROM (range of motion) encouraged  Intervention: Optimize Sensory and Perceptual Ability  Recent Flowsheet Documentation  Taken 4/9/2023 0000 by Kerrie Villaseñor RN  Pressure Reduction Techniques:   frequent weight shift encouraged   heels elevated off bed   positioned off wounds  Sensation Impairment Protection: cues provided for safety  Pressure Reduction Devices: positioning supports utilized  Taken 4/8/2023 2000 by Kerrie Villaseñor RN  Pressure Reduction Techniques:   frequent weight shift encouraged   heels elevated off bed   positioned off wounds  Sensation Impairment Protection: cues provided for safety  Pressure Reduction Devices: positioning supports utilized     Problem: Swallowing Impairment (Stroke, Ischemic/Transient Ischemic Attack)  Goal: Optimal Eating and Swallowing without Aspiration  Outcome: Ongoing, Progressing  Intervention: Optimize Eating and Swallowing  Recent Flowsheet Documentation  Taken 4/9/2023 0000 by Kerrie Villaseñor RN  Aspiration Precautions:   awake/alert before oral intake   upright posture maintained  Swallowing Interventions: Dysphagia: upright position maintained 30 mins after intake  Taken 4/8/2023 2200 by Kerrie Villaseñor RN  Aspiration Precautions:   awake/alert before oral intake   upright posture maintained  Taken 4/8/2023 2000 by Kerrie Villaseñor RN  Aspiration Precautions:   awake/alert before oral intake   upright posture maintained  Swallowing Interventions: Dysphagia: upright position maintained 30  mins after intake     Problem: Urinary Elimination Impaired (Stroke, Ischemic/Transient Ischemic Attack)  Goal: Effective Urinary Elimination  Outcome: Ongoing, Progressing  Intervention: Promote Effective Bladder Elimination  Recent Flowsheet Documentation  Taken 4/9/2023 0000 by Kerrie Villaseñor RN  Urinary Elimination Promotion: toileting offered  Taken 4/8/2023 2000 by Kerrie Villaseñor RN  Urinary Elimination Promotion: toileting offered     Problem: Fall Injury Risk  Goal: Absence of Fall and Fall-Related Injury  Outcome: Ongoing, Progressing  Intervention: Identify and Manage Contributors  Recent Flowsheet Documentation  Taken 4/9/2023 0000 by Kerrie Villaseñor RN  Medication Review/Management: medications reviewed  Self-Care Promotion:   independence encouraged   BADL personal objects within reach   BADL personal routines maintained  Taken 4/8/2023 2200 by Kerrie Villaseñor RN  Medication Review/Management: medications reviewed  Self-Care Promotion:   independence encouraged   BADL personal objects within reach   BADL personal routines maintained  Taken 4/8/2023 2000 by Kerrie Villaseñor RN  Medication Review/Management: medications reviewed  Self-Care Promotion:   independence encouraged   BADL personal objects within reach   BADL personal routines maintained  Intervention: Promote Injury-Free Environment  Recent Flowsheet Documentation  Taken 4/9/2023 0000 by Kerrie Villaseñor RN  Safety Promotion/Fall Prevention:   activity supervised   assistive device/personal items within reach   clutter free environment maintained   fall prevention program maintained   lighting adjusted   nonskid shoes/slippers when out of bed   room organization consistent   safety round/check completed  Taken 4/8/2023 2200 by Kerrie Villaseñor RN  Safety Promotion/Fall Prevention:   activity supervised   assistive device/personal items within reach   clutter free environment maintained   fall prevention program maintained    lighting adjusted   nonskid shoes/slippers when out of bed   room organization consistent   safety round/check completed  Taken 4/8/2023 2000 by Kerrie Villaseñor, RN  Safety Promotion/Fall Prevention:   activity supervised   assistive device/personal items within reach   clutter free environment maintained   fall prevention program maintained   lighting adjusted   nonskid shoes/slippers when out of bed   room organization consistent   safety round/check completed   Goal Outcome Evaluation:  Plan of Care Reviewed With: patient, family        Progress: improving  Outcome Evaluation: new admit

## 2023-04-09 NOTE — THERAPY EVALUATION
Patient Name: Lexis Hernandez  : 1980    MRN: [id number]                              Today's Date: 2023       Admit Date: 2023    Visit Dx:     ICD-10-CM ICD-9-CM   1. Left-sided weakness  R53.1 728.87     Patient Active Problem List   Diagnosis   • Left-sided weakness   • Right sided weakness   • Tension type headache   • Trigeminal neuralgia   • Primary hypertension   • Primary osteoarthritis   • Vertebral bodies impingement syndrome   • Bulging lumbar disc     Past Medical History:   Diagnosis Date   • Hypertension    • Left bundle branch block (LBBB)    • Trigeminal neuralgia      Past Surgical History:   Procedure Laterality Date   •  SECTION     •  SECTION     • EYE SURGERY Bilateral     for lazy eye   • KNEE ARTHROSCOPY W/ MENISCAL REPAIR Right    • LAPAROSCOPIC TUBAL LIGATION         • VAGINAL BIRTH AFTER  SECTION     • VAGINAL REPAIR  2012    vaginal wall repair      General Information     Row Name 23          Physical Therapy Time and Intention    Document Type evaluation  -PG     Mode of Treatment physical therapy  -PG     Row Name 23          General Information    Patient Profile Reviewed yes  -PG     Prior Level of Function independent:  -PG     Existing Precautions/Restrictions fall  -PG     Barriers to Rehab none identified  -PG     Row Name 23          Living Environment    People in Home spouse;child(jj), dependent  -PG     Row Name 23          Cognition    Orientation Status (Cognition) oriented x 4  -PG     Row Name 23          Safety Issues, Functional Mobility    Safety Issues Affecting Function (Mobility) safety precaution awareness  -PG     Impairments Affecting Function (Mobility) balance;postural/trunk control;endurance/activity tolerance;strength  -PG           User Key  (r) = Recorded By, (t) = Taken By, (c) = Cosigned By    Initials Name Provider Type    PG  Callie Lieberman, PT Physical Therapist               Mobility     Row Name 04/08/23 1945          Bed Mobility    Bed Mobility bed mobility (all) activities  -PG     All Activities, [place] (Bed Mobility) standby assist  -PG     Assistive Device (Bed Mobility) head of bed elevated  -PG     Row Name 04/08/23 1945          Sit-Stand Transfer    Sit-Stand [place] (Transfers) minimum assist (75% patient effort)  -PG     Assistive Device (Sit-Stand Transfers) walker, front-wheeled  -PG     Row Name 04/08/23 1945          Gait/Stairs (Locomotion)    [place] Level (Gait) minimum assist (75% patient effort)  -PG     Assistive Device (Gait) walker, front-wheeled  -PG     Distance in Feet (Gait) 16FT  -PG     Left Sided Gait Deviations knee buckling, left side  -PG           User Key  (r) = Recorded By, (t) = Taken By, (c) = Cosigned By    Initials Name Provider Type    PG Callie Lieberman PT Physical Therapist               Obj/Interventions     Row Name 04/08/23 1946          Range of Motion Comprehensive    General Range of Motion bilateral upper extremity ROM WFL;bilateral lower extremity ROM WFL  -PG     Row Name 04/08/23 1946          Strength Comprehensive (MMT)    Comment, General Manual Muscle Testing (MMT) Assessment LUE grossly 3+/5, LLE grossly 3+/5, RLE grossly  4/5  -PG     Row Name 04/08/23 1946          Balance    Balance Assessment sitting static balance;sitting dynamic balance;sit to stand dynamic balance;standing dynamic balance  -PG     Static Sitting Balance standby assist  -PG     Dynamic Sitting Balance minimal assist;contact guard  -PG     Position, Sitting Balance sitting edge of bed;unsupported  -PG     Sit to Stand Dynamic Balance minimal assist  -PG     Dynamic Standing Balance minimal assist  -PG     Position/Device Used, Standing Balance walker, rolling  -PG           User Key  (r) = Recorded By, (t) = Taken By, (c) = Cosigned By    Initials Name Provider Type    KATHERINE Lieberman  Callie, PT Physical Therapist               Goals/Plan     Row Name 04/08/23 1958          Bed Mobility Goal 1 (PT)    Activity/Assistive Device (Bed Mobility Goal 1, PT) bed mobility activities, all  -PG     [place] Level/Cues Needed (Bed Mobility Goal 1, PT) independent  -PG     Time Frame (Bed Mobility Goal 1, PT) long term goal (LTG);2 weeks  -PG     Row Name 04/08/23 1958          Transfer Goal 1 (PT)    Activity/Assistive Device (Transfer Goal 1, PT) transfers, all  -PG     [place] Level/Cues Needed (Transfer Goal 1, PT) modified independence  -PG     Time Frame (Transfer Goal 1, PT) long term goal (LTG);2 weeks  -PG     Row Name 04/08/23 1958          Gait Training Goal 1 (PT)    Activity/Assistive Device (Gait Training Goal 1, PT) gait (walking locomotion)  -PG     [place] Level (Gait Training Goal 1, PT) modified independence  -PG     Distance (Gait Training Goal 1, PT) 200ft  -PG     Time Frame (Gait Training Goal 1, PT) long term goal (LTG);2 weeks  -PG     Row Name 04/08/23 1958          Problem Specific Goal 1 (PT)    Problem Specific Goal 1 (PT) to improve strength  -PG     Time Frame (Problem Specific Goal 1, PT) long-term goal (LTG);2 weeks  -PG     Row Name 04/08/23 1958          Therapy Assessment/Plan (PT)    Planned Therapy Interventions (PT) balance training;bed mobility training;gait training;home exercise program;neuromuscular re-education;patient/family education;postural re-education;transfer training;strengthening;ROM (range of motion)  -PG           User Key  (r) = Recorded By, (t) = Taken By, (c) = Cosigned By    Initials Name Provider Type    PG Callie Lieberman, PT Physical Therapist               Clinical Impression     Row Name 04/08/23 1950          Pain    Pretreatment Pain Rating 0/10 - no pain  -PG     Posttreatment Pain Rating 0/10 - no pain  -PG     Row Name 04/08/23 1950          Plan of Care Review    Plan of Care Reviewed With patient  -PG     Progress  improving  -PG     Outcome Evaluation Patient 41 yo female admitted to the hospital with the complaint of dizziness and left side weakness. CT head reviewed and normal. CTA did not show any significant large vessel occlusion. MRI brain showed no acute abnormality. Recent Dx includes Left-sided weakness. At baseline, patient resides with spouse in a house and is independent in mobility and ADLs without using AD. As per today's evaluation, patient required SBA for Bed Mob, Patient attempted twice for transfers as her L knee buckled in first attempt and required Min A and RW for transfers and ambulation of around 16ft. Pt will continue to benefit from skilled PT intervention during stay at [facility] and current recommendation is home with HHPT unless pt continues to decline during stay.  -PG     Row Name 04/08/23 1950          Therapy Assessment/Plan (PT)    Patient/Family Therapy Goals Statement (PT) to return to [facility]  -PG     Rehab Potential (PT) good, to achieve stated therapy goals  -PG     Criteria for Skilled Interventions Met (PT) yes;skilled treatment is necessary  -PG     Therapy Frequency (PT) 5 times/wk  -PG     Predicted Duration of Therapy Intervention (PT) until d/c  -PG     Row Name 04/08/23 1950          Vital Signs    Pre Systolic BP Rehab 129  -PG     Pre Treatment Diastolic BP 76  -PG     Post Systolic BP Rehab 133  -PG     Post Treatment Diastolic BP 68  -PG     Pretreatment Heart Rate (beats/min) 69  -PG     Posttreatment Heart Rate (beats/min) 71  -PG     Pretreatment Resp Rate (breaths/min) 15  -PG     Posttreatment Resp Rate (breaths/min) 14  -PG     Pre SpO2 (%) 100  -PG     O2 Delivery Pre Treatment room air  -PG     Post SpO2 (%) 100  -PG     O2 Delivery Post Treatment room air  -PG     Row Name 04/08/23 1950          Positioning and Restraints    Pre-Treatment Position in bed  -PG     Post Treatment Position bed  -PG     In Bed notified nsg;call light within reach;encouraged to call for  assist;exit alarm on  -PG           User Key  (r) = Recorded By, (t) = Taken By, (c) = Cosigned By    Initials Name Provider Type    Callie Newby PT Physical Therapist               Outcome Measures     Row Name 04/08/23 1959          How much help from another person do you currently need...    Turning from your back to your side while in flat bed without using bedrails? 4  -PG     Moving from lying on back to sitting on the side of a flat bed without bedrails? 3  -PG     Moving to and from a bed to a chair (including a wheelchair)? 3  -PG     Standing up from a chair using your arms (e.g., wheelchair, bedside chair)? 3  -PG     Climbing 3-5 steps with a railing? 2  -PG     To walk in hospital room? 3  -PG     AM-PAC 6 Clicks Score (PT) 18  -PG     Highest level of mobility 6 --> Walked 10 steps or more  -PG     Row Name 04/08/23 1959          Modified [place] Scale    Modified [place] Scale 4 - Moderately severe disability.  Unable to walk without assistance, and unable to attend to own bodily needs without assistance.  -PG     Row Name 04/08/23 1959          Functional Assessment    Outcome Measure Options AM-PAC 6 Clicks Basic Mobility (PT);Modified [place]  -PG           User Key  (r) = Recorded By, (t) = Taken By, (c) = Cosigned By    Initials Name Provider Type    Callie Newby PT Physical Therapist                             Physical Therapy Education     Title: PT OT SLP Therapies (Done)     Topic: Physical Therapy (Done)     Point: Mobility training (Done)     Learning Progress Summary           Patient Acceptance, E, VU by PG at 4/8/2023 2000                   Point: Home exercise program (Done)     Learning Progress Summary           Patient Acceptance, E, VU by PG at 4/8/2023 2000                   Point: Body mechanics (Done)     Learning Progress Summary           Patient Acceptance, E, VU by PG at 4/8/2023 2000                   Point: Precautions (Done)     Learning Progress Summary            Patient Acceptance, E, VU by PG at 4/8/2023 2000                               User Key     Initials Effective Dates Name Provider Type Discipline    PG 09/01/22 -  Callie Lieberman PT Physical Therapist PT              PT Recommendation and Plan  Planned Therapy Interventions (PT): balance training, bed mobility training, gait training, home exercise program, neuromuscular re-education, patient/family education, postural re-education, transfer training, strengthening, ROM (range of motion)  Plan of Care Reviewed With: patient  Progress: improving  Outcome Evaluation: Patient 43 yo female admitted to the hospital with the complaint of dizziness and left side weakness. CT head reviewed and normal. CTA did not show any significant large vessel occlusion. MRI brain showed no acute abnormality. Recent Dx includes Left-sided weakness. At baseline, patient resides with spouse in a house and is independent in mobility and ADLs without using AD. As per today's evaluation, patient required SBA for Bed Mob, Patient attempted twice for transfers as her L knee buckled in first attempt and required Min A and RW for transfers and ambulation of around 16ft. Pt will continue to benefit from skilled PT intervention during stay at [facility] and current recommendation is home with HHPT unless pt continues to decline during stay.     Time Calculation:    PT Charges     Row Name 04/08/23 2001             Time Calculation    Start Time 1323  -PG      Stop Time 1350  -PG      Time Calculation (min) 27 min  -PG      PT Received On 04/08/23  -PG      PT - Next Appointment 04/10/23  -PG      PT Goal Re-Cert Due Date 04/22/23  -PG         Time Calculation- PT    Total Timed Code Minutes- PT 0 minute(s)  -PG            User Key  (r) = Recorded By, (t) = Taken By, (c) = Cosigned By    Initials Name Provider Type    PG Callie Lieberman PT Physical Therapist              Therapy Charges for Today     Code Description Service Date Service Provider  Modifiers Qty    [id number] HC PT EVAL MOD COMPLEXITY 4 4/8/2023 Callie Lieberman, PT GP 1          PT G-Codes  Outcome Measure Options: AM-PAC 6 Clicks Basic Mobility (PT), Modified [place]  AM-PAC 6 Clicks Score (PT): 18  Modified [place] Scale: 4 - Moderately severe disability.  Unable to walk without assistance, and unable to attend to own bodily needs without assistance.  PT Discharge Summary  Anticipated Discharge Disposition (PT): home with home health    Callie Lieberman, PT  4/8/2023

## 2023-04-09 NOTE — CASE MANAGEMENT/SOCIAL WORK
Continued Stay Note  [facility]     Patient Name: Lexis Hernandez  MRN: [id number]  Today's Date: 4/9/2023    Admit Date: 4/7/2023    Plan: Plan to return home. Referrals in place for [facility] and OPPT at [facility]. Orders in place. Acceptance pending.   Discharge Plan     Row Name 04/09/23 1532       Plan    Plan Comments Pt requesting RW. Order obtained and referral to [facility]. RW delivered to pt at bedside via [facility] closet.    Row Name 04/09/23 1327       Plan    Plan Plan to return home. Referrals in place for [facility] and OPPT at [facility]. Orders in place. Acceptance pending.    Patient/Family in Agreement with Plan yes    Plan Comments RN notified [facility] requesting OPPT and Pain management clinic. Orders obtained, and referrals placed; acceptance pendind. Information on AVS. Pt instructed to call Monday to schedule appointments.    Final Discharge Disposition Code 01 - home or self-care    Final Note home with spouse               Discharge Codes    No documentation.               Expected Discharge Date and Time     Expected Discharge Date Expected Discharge Time    Apr 9, 2023             Isabel Lo RN

## 2023-04-09 NOTE — DISCHARGE PLACEMENT REQUEST
"Cheri Owens (42 y.o. Female)     Date of Birth   1980    Social Security Number       Address   [address] [place] [facility]    Home Phone   [phone]    MRN   [id number]       Taoism   Quaker    Marital Status                               Admission Date   23    Admission Type   Emergency    Admitting Provider   Jc Macedo MD    Attending Provider   Jc Macedo MD    Department, Room/Bed   [facility] NEURO HEART, 258/       Discharge Date       Discharge Disposition   Home or Self Care    Discharge Destination                               Attending Provider: Jc Macedo MD    Allergies: Adhesive Tape    Isolation: None   Infection: None   Code Status: CPR    Ht: 165.1 cm (65\")   Wt: 94.6 kg (208 lb 8.9 oz)    Admission Cmt: None   Principal Problem: Left-sided weakness [R53.1]                 Active Insurance as of 2023     Primary Coverage     Payor Plan Insurance Group Employer/Plan Group    [employer] MEDICAID [place] MED[place] [id number]     Payor Plan Address Payor Plan Phone Number Payor Plan Fax Number Effective Dates    [address] [address] [phone]  2020 - None Entered    [facility] [id number]       Subscriber Name Subscriber Birth Date Member ID       CHERI OWENS 1980 [id number]                 Emergency Contacts      (Rel.) Home Phone Work Phone Mobile Phone    VIVIAN MARTIN (Spouse) [phone]               History & Physical      Jc Macedo MD at 23 1909              [facility]      Patient Name: Cheri Owens  : 1980  MRN: [id number]  Primary Care Physician:  Nataly Barr APRN  Date of admission: 2023      Subjective       Chief Complaint: Bilateral lower extremity weakness, headache, dizziness, nausea and unsteady gait.    History of Present Illness: Cheri Owens is a 42 y.o. " female who presented to [facility] on 4/7/2023 complaining of bilateral lower extremity weakness, headache, nausea and unsteady gait including dragging her feet.  Patient is a 42-year-old female with past medical history of trigeminal neuralgia, osteoarthritis of the knees status post left knee replacement and a primary hypertension who presented to the emergency room because of a sudden onset of a neurologic symptoms.  Patient reported that her symptoms started around 0800 on the day of presentation with right-sided weakness and then shifted to left-sided weakness associated with headache, dizziness, gait abnormality including dragging of her feet.  It was also noted that patient had some numbness on the right side before switching to the left.  The  noted that she started having a frontal headache before the headache became diffused.  Patient reported improvement in her symptoms at the time she was seen but still having difficulties with gait.  A code stroke was called in the emergency room and neurology consult was completed.  CT scan of the head and brain and MRI were completed and were negative.  CT angiogram of the head and the neck were completed and did not show any major vessel stenosis, thrombosis or plaques.    Patient reported no fever or chills, no visual obscurations, no hot or cold intolerance, no constipation or diarrhea, no chest pain or abdominal pain and no leg swelling or leg pain.      Review of Systems   Constitutional: Negative.   Eyes: Negative.    Cardiovascular: Negative.    Respiratory: Negative.    Endocrine: Negative.    Hematologic/Lymphatic: Negative.    Skin: Negative.    Musculoskeletal: Negative.    Gastrointestinal: Negative.    Genitourinary: Negative.    Neurological: Positive for dizziness, focal weakness, headaches and loss of balance.   Psychiatric/Behavioral: Negative.    Allergic/Immunologic: Negative.           Personal History     Past medical history:  1.   Primary hypertension.  2.  Trigeminal neuralgia.  3.  Primary osteoarthritis of the knee.      Past surgical history:  1.  Left knee arthroplasty.      Family History:   Father: Unknown.  Mother: Anxiety and depression        Social History:     1.  No tobacco.  2.  Occasional alcohol.  3.  No IV drug use.  4.  Patient lives at home with her family,  and kids.      Home Medications:  Prior to Admission Medications     Prescriptions Last Dose Informant Patient Reported? Taking?    buPROPion XL (WELLBUTRIN XL) 150 MG 24 hr tablet 4/7/2023  Yes Yes    Take 1 tablet by mouth Daily.    lamoTRIgine (LaMICtal) 25 MG tablet 4/7/2023  Yes Yes    Take 1 tablet by mouth 2 (Two) Times a Day.    lisinopril (PRINIVIL,ZESTRIL) 10 MG tablet 4/7/2023  Yes Yes    Take 1 tablet by mouth Daily.    OXcarbazepine (TRILEPTAL) 150 MG tablet 4/7/2023  Yes Yes    Take 3 tablets by mouth 2 (Two) Times a Day.            Allergies:  Allergies   Allergen Reactions   • Adhesive Tape Rash       Objective       Vitals:   Temp:  [98.5 °F (36.9 °C)] 98.5 °F (36.9 °C)  Heart Rate:  [64-69] 68  Resp:  [18] 18  BP: (132-159)/(76-94) 132/84    Physical Exam  Vitals reviewed.   Constitutional:       General: She is not in acute distress.  HENT:      Head: Normocephalic and atraumatic.      Nose: Nose normal. No congestion or rhinorrhea.      Mouth/Throat:      Mouth: Mucous membranes are moist.      Pharynx: Oropharynx is clear. No oropharyngeal exudate or posterior oropharyngeal erythema.   Eyes:      Pupils: Pupils are equal, round, and reactive to light.   Cardiovascular:      Pulses: Normal pulses.      Heart sounds: Normal heart sounds. No murmur heard.    No friction rub. No gallop.      Comments: S1 and S2 present.  No tachycardia.  Pulmonary:      Effort: No respiratory distress.      Breath sounds: No wheezing, rhonchi or rales.   Chest:      Chest wall: No tenderness.   Abdominal:      General: Abdomen is flat. Bowel sounds are normal.  There is no distension.      Palpations: Abdomen is soft.      Tenderness: There is no abdominal tenderness. There is no right CVA tenderness or guarding.   Musculoskeletal:         General: No swelling, tenderness, deformity or signs of injury.      Cervical back: Neck supple. No tenderness.      Right lower leg: No edema.      Left lower leg: No edema.   Skin:     Capillary Refill: Capillary refill takes less than 2 seconds.      Coloration: Skin is not jaundiced.      Findings: No bruising, lesion or rash.   Neurological:      Mental Status: She is alert and oriented to person, place, and time.      Comments: No facial asymmetry noted.  Gait and station not tested.  Strength reduced on both sides left worse than the right.  Strength 3/5 on the left and 4/5 on the right.  Lower extremities not tested.  Patient was alert and oriented.   Psychiatric:         Behavior: Behavior normal.            Result Review    Result Review:  I have personally reviewed the results from the time of this admission to 4/7/2023 19:33 EDT and agree with these findings:  []  Laboratory  []  Microbiology  []  Radiology  []  EKG/Telemetry   []  Cardiology/Vascular   []  Pathology  []  Old records  []  Other:  Most notable findings include:       Assessment & Plan        Active Hospital Problems:  Active Hospital Problems    Diagnosis    • **Left-sided weakness    • Right sided weakness    • Tension type headache    • Trigeminal neuralgia    • Primary hypertension    • Primary osteoarthritis            Plan:        -Continue appropriate patient's home medications for other chronic medical conditions.  -Continue the present level of care.  -Patient and family agreed with the plan of care.  -Follow neurology recommendations.  -Continue stroke protocol.  -Treat hypertension with lisinopril and hydralazine as needed.      DVT prophylaxis:  Mechanical DVT prophylaxis orders are present.    CODE STATUS:    Level Of Support Discussed With:  Patient  Code Status (Patient has no pulse and is not breathing): CPR (Attempt to Resuscitate)  Medical Interventions (Patient has pulse or is breathing): Full Support  Release to patient: Routine Release    Admission Status:  I believe this patient meets observation status.    I discussed the patient's findings and my recommendations with patient, family, nursing staff, primary care team and consulting provider.    This patient has been examined wearing appropriate Personal Protective Equipment and discussed with hospital infection control department, [facility], infectious disease specialist and pulmonologist. 23      Signature:Electronically signed by Jc Macedo MD, FACP, 23, 7:09 PM EDT.    Electronically signed by Jc Macedo MD at 23 1938          Physician Progress Notes (last 24 hours)      Jc Macedo MD at 23 1728              [facility] Medicine Services Daily Progress Note    Patient Name: Lexis Hernandez  : 1980  MRN: [id number]  Primary Care Physician:  Nataly Barr APRN  Date of admission: 2023      Subjective       Chief Complaint: Left lower extremity weakness.      Patient Reports:      2023.  Patient was seen and examined.  Patient reported slight improvement in her symptoms.    Review of Systems   Constitutional: Positive for malaise/fatigue.   HENT: Negative.    Eyes: Negative.    Cardiovascular: Negative.    Respiratory: Negative.    Endocrine: Negative.    Hematologic/Lymphatic: Negative.    Skin: Negative.    Musculoskeletal: Negative.    Gastrointestinal: Negative.    Genitourinary: Negative.    Neurological: Negative.    Psychiatric/Behavioral: Negative.    Allergic/Immunologic: Negative.             Objective       Vitals:   Temp:  [97.4 °F (36.3 °C)-98.8 °F (37.1 °C)] 98.1 °F (36.7 °C)  Heart Rate:  [56-77] 77  Resp:  [13-18] 15  BP: (104-133)/(61-77) 133/68    Physical Exam  Vitals  reviewed.   Constitutional:       General: She is not in acute distress.  HENT:      Head: Normocephalic and atraumatic.      Nose: Nose normal. No congestion or rhinorrhea.      Mouth/Throat:      Mouth: Mucous membranes are moist.      Pharynx: Oropharynx is clear. No oropharyngeal exudate or posterior oropharyngeal erythema.   Eyes:      Pupils: Pupils are equal, round, and reactive to light.   Cardiovascular:      Pulses: Normal pulses.      Heart sounds: Normal heart sounds. No murmur heard.    No friction rub. No gallop.      Comments: S1 and S2 present.  No tachycardia.  Pulmonary:      Effort: No respiratory distress.      Breath sounds: No wheezing, rhonchi or rales.   Chest:      Chest wall: No tenderness.   Abdominal:      General: Abdomen is flat. Bowel sounds are normal. There is no distension.      Palpations: Abdomen is soft.      Tenderness: There is no abdominal tenderness. There is no right CVA tenderness or guarding.   Musculoskeletal:         General: No swelling, tenderness, deformity or signs of injury.      Cervical back: Neck supple. No tenderness.      Right lower leg: No edema.      Left lower leg: No edema.   Skin:     Capillary Refill: Capillary refill takes less than 2 seconds.      Coloration: Skin is not jaundiced.      Findings: No bruising, lesion or rash.   Neurological:      Mental Status: She is alert.      Comments: No facial asymmetry noted.  Gait and station not tested.   Psychiatric:      Comments: No agitation.               Result Review    Result Review:  I have personally reviewed the results from the time of this admission to 4/8/2023 17:33 EDT and agree with these findings:  [x]  Laboratory  [x]  Microbiology  [x]  Radiology  []  EKG/Telemetry   []  Cardiology/Vascular   []  Pathology  []  Old records  []  Other:  Most notable findings include:       Procedure Component Value Units Date/Time    MRI Lumbar Spine Without Contrast [[id number]] Isra as Reviewed   Order  Status: Completed Collected: 04/08/23 1228    Updated: 04/08/23 1243   Narrative:     MRI LUMBAR SPINE WO CONTRAST     Date of Exam: 4/8/2023 11:20 AM EDT     Indication: Ataxia or coordination problem (Ped 0-17y).       Comparison: None available.     Technique:  Routine multiplanar/multisequence sequence images of the lumbar spine were obtained without contrast administration.       Findings:   T12-L1: No disc protrusion or extrusion. No central canal or foraminal stenosis     L-1-L2: Left paracentral/foraminal protrusion. No central nerve root compression. Mild left foraminal stenosis     L2-L3: Mild diffuse disc bulge. No significant central canal or foraminal stenosis. Focal T2 signal in the left extra foraminal annulus compatible with annular fissure. Incidental bilateral nerve root sleeve cysts     L3-L4: Diffuse disc bulge. Bilateral facet arthropathy. Mild central canal stenosis. Mild bilateral foraminal stenosis. Focal T2 signal in the left extraforaminal annulus compatible with annular fissure. Incidental bilateral nerve root sleeve cysts     L4-L5: Mild diffuse disc bulge. Bilateral facet arthropathy resulting in slight L4 anterolisthesis. No significant central canal stenosis. Moderate bilateral foraminal stenosis. Focal T2 signal in the left extraforaminal annulus compatible with annular   fissure. Incidental bilateral nerve root sleeve cysts     L5-S1: Broad-based central/left paracentral/left foraminal disc bulge. Left facet arthropathy. The bulging disc contacts the left S1 nerve root as it exits the thecal sac without obvious compression. No thecal sac compression. Moderate left foraminal   stenosis. Incidental right-sided nerve root sleeve cyst     Conus normal in appearance terminating at L1-L2. No abnormality of the cauda equina. Normal generalized marrow signal. Incidental sacral Tarlov cysts. No paraspinal mass or fluid collection. Aorta normal in caliber    Impression:     Impression:    Multilevel lumbar degenerative disease     Electronically Signed: Manish Ballard    4/8/2023 12:41 PM EDT                Assessment & Plan    From previous notes and with minor updates.    Brief Patient Summary:    Patient is a 42-year-old female with past medical history of obesity, trigeminal neuralgia, osteoarthritis of the knees status post left knee replacement and a primary hypertension who presented to the emergency room because of a sudden onset of a neurologic symptoms.  Patient reported that her symptoms started around 0800 on the day of presentation with right-sided weakness and then shifted to left-sided weakness associated with headache, dizziness, gait abnormality including dragging of her feet.  It was also noted that patient had some numbness on the right side before switching to the left.  The  noted that she started having a frontal headache before the headache became diffused.  Patient reported improvement in her symptoms at the time she was seen but still having difficulties with gait.  A code stroke was called in the emergency room and neurology consult was completed.  CT scan of the head and brain and MRI were completed and were negative.  CT angiogram of the head and the neck were completed and did not show any major vessel stenosis, thrombosis or plaques.           aspirin, 325 mg, Oral, Daily   Or  aspirin, 300 mg, Rectal, Daily  atorvastatin, 80 mg, Oral, Nightly  buPROPion XL, 150 mg, Oral, Daily  lamoTRIgine, 25 mg, Oral, BID  lisinopril, 10 mg, Oral, Daily  OXcarbazepine, 450 mg, Oral, BID  senna-docusate sodium, 2 tablet, Oral, BID  sodium chloride, 10 mL, Intravenous, Q12H  sodium chloride, 10 mL, Intravenous, Q12H       sodium chloride, 100 mL/hr, Last Rate: 100 mL/hr (04/08/23 1048)         Active Hospital Problems:  Active Hospital Problems    Diagnosis    • **Left-sided weakness    • Vertebral bodies impingement syndrome    • Bulging lumbar disc    • Right sided weakness     • Tension type headache    • Trigeminal neuralgia    • Primary hypertension    • Primary osteoarthritis      Plan:-Continue appropriate patient's home medications for the chronic medical conditions.  -Continue the present level of care.  -Patient and family agreed with the plan of care.  -Completed neurosurgery consulted because of a bulging disc in L5-S1 causing possible impingement syndrome.      DVT prophylaxis:  Mechanical DVT prophylaxis orders are present.    CODE STATUS:    Level Of Support Discussed With: Patient  Code Status (Patient has no pulse and is not breathing): CPR (Attempt to Resuscitate)  Medical Interventions (Patient has pulse or is breathing): Full Support  Release to patient: Routine Release      Disposition: This wonderful patient can discharge in the next 24 to 48 hours.    This patient has been examined wearing appropriate Personal Protective Equipment and discussed with hospital infection control department, [facility], infectious disease specialist and pulmonologist. 04/08/23      Electronically signed by Jc Macedo MD, FACP, 04/08/23, 17:33 EDT.      [facility] Hospitalist Team             Electronically signed by Jc Macedo MD at 04/08/23 1741          Consult Notes (last 24 hours)      Travis Carvalho PA at 04/09/23 0905      Consult Orders    1. IP Consult to Neurosurgery [[id number]] ordered by Jc Macedo MD at 04/08/23 1611          Attestation signed by Faisal Lopez MD at 04/09/23 1235    I have reviewed this documentation and agree.             Summary:Neurosurgery consult               Neurosurgery Consultation      Patient: Lexis Hernandez    Sex: female    YOB: 1980    Date of Admission: 4/7/2023  1:06 PM    Admitting Dx: Left-sided weakness [R53.1]    Reason for Consult: Left leg weakness      Subjective     CC: Left-sided LBP with left leg weakness since Friday    HPI:  Patient is a 42 y.o. female with  HTN and trigeminal neuralgia currently in the hospital due to acute left-sided weakness.  Neurosurgery was consulted due to MRI findings concerning for herniated disc.    During my evaluation patient reports she woke up 2 days ago (Friday) with left-sided weakness, particularly in her leg.  She denies injury or inciting event prior to the onset of symptoms.  This was severe at first but has somewhat improved today.  She now describes left-sided low back pain that radiates down the back of her left leg and into her foot.  Pain is associated with numbness and tingling.  She denies bowel/bladder dysfunction and saddle anesthesia.  She is asymptomatic on the right side.  She reports she has previously received epidural steroid injections at L3-4 several years ago.      PMH:  Past Medical History:   Diagnosis Date   • Hypertension    • Left bundle branch block (LBBB)    • Trigeminal neuralgia          Current Facility-Administered Medications:   •  acetaminophen (TYLENOL) tablet 650 mg, 650 mg, Oral, Q4H PRN, 650 mg at 04/08/23 2056 **OR** acetaminophen (TYLENOL) 160 MG/5ML solution 650 mg, 650 mg, Oral, Q4H PRN **OR** acetaminophen (TYLENOL) suppository 650 mg, 650 mg, Rectal, Q4H PRN, Jc Macedo MD  •  aluminum-magnesium hydroxide-simethicone (MAALOX MAX) 400-400-40 MG/5ML suspension 15 mL, 15 mL, Oral, Q6H PRN, Jc Macedo MD  •  aspirin tablet 325 mg, 325 mg, Oral, Daily, 325 mg at 04/09/23 0823 **OR** aspirin suppository 300 mg, 300 mg, Rectal, Daily, Jc Macedo MD  •  atorvastatin (LIPITOR) tablet 80 mg, 80 mg, Oral, Nightly, Jc Macedo MD, 80 mg at 04/08/23 2056  •  sennosides-docusate (PERICOLACE) 8.6-50 MG per tablet 2 tablet, 2 tablet, Oral, BID, 2 tablet at 04/09/23 0823 **AND** polyethylene glycol (MIRALAX) packet 17 g, 17 g, Oral, Daily PRN **AND** bisacodyl (DULCOLAX) EC tablet 5 mg, 5 mg, Oral, Daily PRN **AND** bisacodyl (DULCOLAX) suppository 10 mg, 10 mg, Rectal, Daily PRN,  Jc Macedo MD  •  buPROPion XL (WELLBUTRIN XL) 24 hr tablet 150 mg, 150 mg, Oral, Daily, Jc Macedo MD, 150 mg at 23  •  calcium carbonate (TUMS) chewable tablet 500 mg (200 mg elemental), 2 tablet, Oral, TID PRN, Ana Chiu, APRN, 2 tablet at 23 05  •  lamoTRIgine (LaMICtal) tablet 25 mg, 25 mg, Oral, BID, Jc Macedo MD, 25 mg at 23  •  lisinopril (PRINIVIL,ZESTRIL) tablet 10 mg, 10 mg, Oral, Daily, cJ Macedo MD, 10 mg at 23 08  •  nitroglycerin (NITROSTAT) SL tablet 0.4 mg, 0.4 mg, Sublingual, Q5 Min PRN, Jc Macedo MD  •  ondansetron (ZOFRAN) injection 4 mg, 4 mg, Intravenous, Q6H PRN, Jc Macedo MD, 4 mg at 23 1807  •  OXcarbazepine (TRILEPTAL) tablet 450 mg, 450 mg, Oral, BID, Jc Macedo MD, 450 mg at 23  •  oxyCODONE (ROXICODONE) immediate release tablet 10 mg, 10 mg, Oral, Q4H PRN, Jc Macedo MD  •  oxyCODONE (ROXICODONE) immediate release tablet 5 mg, 5 mg, Oral, Q4H PRN, Jc Macedo MD, 5 mg at 23 0407  •  sodium chloride 0.9 % flush 10 mL, 10 mL, Intravenous, PRN, Jc Macedo MD  •  sodium chloride 0.9 % flush 10 mL, 10 mL, Intravenous, Q12H, Jc Macedo MD, 10 mL at 23 0826  •  sodium chloride 0.9 % flush 10 mL, 10 mL, Intravenous, PRN, Jc Macedo MD  •  sodium chloride 0.9 % flush 10 mL, 10 mL, Intravenous, Q12H, Jc Macedo MD, 10 mL at 23 0825  •  sodium chloride 0.9 % flush 10 mL, 10 mL, Intravenous, PRN, Jc Macedo MD  •  sodium chloride 0.9 % infusion 40 mL, 40 mL, Intravenous, PRN, Jc Macedo MD  •  sodium chloride 0.9 % infusion 40 mL, 40 mL, Intravenous, PRN, Jc Macedo MD  •  sodium chloride 0.9 % infusion, 100 mL/hr, Intravenous, Continuous, Jc Macedo MD, Held at 23 1900    Allergies   Allergen Reactions   • Adhesive Tape Rash       Past Surgical History:   Procedure Laterality Date   •   SECTION  2005   •  SECTION     • EYE SURGERY Bilateral 1985    for lazy eye   • KNEE ARTHROSCOPY W/ MENISCAL REPAIR Right    • LAPAROSCOPIC TUBAL LIGATION      2013   • VAGINAL BIRTH AFTER  SECTION     • VAGINAL REPAIR  2012    vaginal wall repair       Social History     Socioeconomic History   • Marital status:    Tobacco Use   • Smoking status: Never   • Smokeless tobacco: Never   Vaping Use   • Vaping Use: Never used   Substance and Sexual Activity   • Alcohol use: Yes     Alcohol/week: 1.0 standard drink     Types: 1 Glasses of wine per week   • Drug use: Never   • Sexual activity: Yes     Partners: Male       Family History   Problem Relation Age of Onset   • COPD Maternal Grandmother    • Heart failure Maternal Grandmother    • Cancer Maternal Grandfather          Current Medications:  Scheduled Meds:aspirin, 325 mg, Oral, Daily   Or  aspirin, 300 mg, Rectal, Daily  atorvastatin, 80 mg, Oral, Nightly  buPROPion XL, 150 mg, Oral, Daily  lamoTRIgine, 25 mg, Oral, BID  lisinopril, 10 mg, Oral, Daily  OXcarbazepine, 450 mg, Oral, BID  senna-docusate sodium, 2 tablet, Oral, BID  sodium chloride, 10 mL, Intravenous, Q12H  sodium chloride, 10 mL, Intravenous, Q12H      Continuous Infusions:sodium chloride, 100 mL/hr, Last Rate: Stopped (23 1900)      PRN Meds: •  acetaminophen **OR** acetaminophen **OR** acetaminophen  •  aluminum-magnesium hydroxide-simethicone  •  senna-docusate sodium **AND** polyethylene glycol **AND** bisacodyl **AND** bisacodyl  •  calcium carbonate  •  nitroglycerin  •  ondansetron  •  oxyCODONE  •  oxyCODONE  •  sodium chloride  •  sodium chloride  •  sodium chloride  •  sodium chloride  •  sodium chloride    ROS:  14 point review of systems was completed and is negative except for what is noted in HPI      Objective     Vitals:    23 2129 23 0158 23 0621 23 0820   BP:  120/76 117/79 137/87   BP Location:  Left arm Left arm     Patient Position:  Lying Sitting    Pulse:   72 68   Resp: 21 15 14    Temp: 98.1 °F (36.7 °C) 97.8 °F (36.6 °C) 97.7 °F (36.5 °C)    TempSrc: Oral Oral Oral    SpO2:   97% 100%   Weight:  94.6 kg (208 lb 8.9 oz)     Height:           Physical exam    General  - WD/WN female, appears their stated age, awake, cooperative, in no acute distress  HEENT  - Normocephalic, atraumatic, PERRLA, EOM intact  Cardiac  - RRR, no peripheral edema  Respiratory  - Normal respiratory rate and effort  Abdomen  - Soft, NT/ND  Musculoskeletal  - No joint redness, swelling, or tenderness  Skin  - Warm and dry, no bleeding, bruising, or rash  NEUROLOGIC  - A/O x3  - 4+/5 left plantarflexion  - 5-/5 bilateral hip flexion, likely secondary pain  - Otherwise full strength and sensation        RESULTS REVIEW:  Results from last 7 days   Lab Units 04/09/23 0347 04/08/23 1443 04/07/23  1329   WBC 10*3/mm3 8.50 7.40 6.10   HEMOGLOBIN g/dL 13.2 13.4 15.4   HEMATOCRIT % 38.9 39.4 48.0*   PLATELETS 10*3/mm3 283 318 372        Results from last 7 days   Lab Units 04/09/23 0347 04/08/23  1443 04/07/23  1329   SODIUM mmol/L 138 140 138   POTASSIUM mmol/L 4.0 4.2 3.9   CHLORIDE mmol/L 103 103 99   CO2 mmol/L 29.0 28.0 28.0   BUN mg/dL 8 8 6   CREATININE mg/dL 0.63 0.70 0.68   CALCIUM mg/dL 8.6 9.1 9.6   BILIRUBIN mg/dL  --   --  0.2   ALK PHOS U/L  --   --  84   ALT (SGPT) U/L  --   --  15   AST (SGOT) U/L  --   --  18   GLUCOSE mg/dL 113* 102* 87              MRI Lumbar Spine Without Contrast    Result Date: 4/8/2023  MRI LUMBAR SPINE WO CONTRAST Date of Exam: 4/8/2023 11:20 AM EDT Indication: Ataxia or coordination problem (Ped 0-17y).  Comparison: None available. Technique:  Routine multiplanar/multisequence sequence images of the lumbar spine were obtained without contrast administration.  Findings: T12-L1: No disc protrusion or extrusion. No central canal or foraminal stenosis L-1-L2: Left paracentral/foraminal protrusion. No central nerve  root compression. Mild left foraminal stenosis L2-L3: Mild diffuse disc bulge. No significant central canal or foraminal stenosis. Focal T2 signal in the left extra foraminal annulus compatible with annular fissure. Incidental bilateral nerve root sleeve cysts L3-L4: Diffuse disc bulge. Bilateral facet arthropathy. Mild central canal stenosis. Mild bilateral foraminal stenosis. Focal T2 signal in the left extraforaminal annulus compatible with annular fissure. Incidental bilateral nerve root sleeve cysts L4-L5: Mild diffuse disc bulge. Bilateral facet arthropathy resulting in slight L4 anterolisthesis. No significant central canal stenosis. Moderate bilateral foraminal stenosis. Focal T2 signal in the left extraforaminal annulus compatible with annular fissure. Incidental bilateral nerve root sleeve cysts L5-S1: Broad-based central/left paracentral/left foraminal disc bulge. Left facet arthropathy. The bulging disc contacts the left S1 nerve root as it exits the thecal sac without obvious compression. No thecal sac compression. Moderate left foraminal stenosis. Incidental right-sided nerve root sleeve cyst Conus normal in appearance terminating at L1-L2. No abnormality of the cauda equina. Normal generalized marrow signal. Incidental sacral Tarlov cysts. No paraspinal mass or fluid collection. Aorta normal in caliber     Impression: Impression: Multilevel lumbar degenerative disease Electronically Signed: Manish Ballard  4/8/2023 12:41 PM EDT  Workstation ID: [id number]    CT Head Without Contrast Stroke Protocol    Result Date: 4/7/2023  CT HEAD WO CONTRAST STROKE PROTOCOL Date of Exam: 4/7/2023 1:35 PM EDT Indication: Neuro deficit, acute stroke suspected Neuro deficit, acute, stroke suspected. Comparison: 8/9/2015 Technique: Axial CT images were obtained of the head without contrast administration.  Reconstructed coronal and sagittal images were also obtained. Automated exposure control and iterative construction  methods were used. Scan Time:  1341 Results discussed with the stroke team at the CT scanner at 1345. Findings: The ventricles and CSF containing spaces are normal in size and configuration. No intra or extra-axial mass lesions, fluid collections, or mass effect are seen. No focal areas of low attenuation or acute intracranial hemorrhage. The mastoid air cells and  paranasal sinuses are clear. The bony calvarium is intact.     Impression: Impression: Normal noncontrast CT of the brain. Electronically Signed: Rafael Roman  4/7/2023 1:47 PM EDT  Workstation ID: [id number]    MRI Brain Without Contrast    Result Date: 4/7/2023  MRI BRAIN WO CONTRAST Date of Exam: 4/7/2023 2:19 PM EDT Indication: Dizziness, non-specific.  Comparison: None available. Technique:  Routine multiplanar/multisequence sequence images of the brain were obtained without contrast administration. Findings: The brain parenchyma is normal in volume and signal intensity on the obtained sequences. Midline structures appear unremarkable. No mass lesion, intracranial hemorrhage, or hydrocephalus is identified. Diffusion-weighted sequences demonstrate no acute infarct. Limited visualization of the intracerebral vessels appears unremarkable. The paranasal sinuses and mastoid air cells show no fluid signal. The orbits, globes, retrobulbar soft tissues appear unremarkable. The visualized superficial soft tissues and cervical spine are without significant abnormality.     Impression: Impression: No acute abnormality is identified within the brain. Specifically, no diffusion restriction is identified to suggest acute infarct. Electronically Signed: Luc Richter  4/7/2023 2:59 PM EDT  Workstation ID: [id number]          Assessment     MDM: This is a 42 y.o. female being evaluated by neurosurgery for acute left-sided low back pain and left leg weakness.  Imaging not consistent with stroke.  She does have a L5-S1 disc herniation with severe left neuroforaminal and  lateral recess stenosis.  Her symptoms are consistent with acute lumbar radiculopathy.  She has no red flags for cauda equina syndrome and no concerning emergent neurologic deficits.  She does not require emergent neurosurgical intervention at this time.  I recommend she be discharged with a referral to outpatient physical therapy as well as to pain management for a left L5 transforaminal epidural steroid injection.  When she is completed these therapies she may follow-up in our outpatient neurosurgery clinic to discuss further treatment options.  Patient is agreeable to this plan.        Plan     1. Lumbar radiculopathy, acute  2. Herniated disc at L5-S1  3. Degenerative disc disease, lumbosacral spine  4. Lumbar stenosis without neurogenic claudication  - No emergent neurosurgical intervention necessary at this time  - Outpatient referral to physical therapy upon discharge  - Outpatient referral to pain management for left L5 transforaminal injection  - Follow-up with me in outpatient neurosurgery clinic after completing PT and getting injections    Medical management per primary team.  Neurosurgery will sign off at this time.  Call with any questions or concerns.    I discussed my assessment and recommendations with Dr. Jessica Carvalho PA-C  4/9/2023  09:05 EDT      Part of this note may be an electronic transcription/translation of spoken language to printed text using the Dragon Dictation System.      Electronically signed by Faisal Lopez MD at 04/09/23 1235          Physical Therapy Notes (last 24 hours)      Callie Lieberman PT at 04/08/23 2000  Version 1 of 1       Goal Outcome Evaluation:  Plan of Care Reviewed With: patient        Progress: improving  Outcome Evaluation: Patient 43 yo female admitted to the hospital with the complaint of dizziness and left side weakness. CT head reviewed and normal. CTA did not show any significant large vessel occlusion. MRI brain showed no acute  abnormality. Recent Dx includes Left-sided weakness. At baseline, patient resides with spouse in a house and is independent in mobility and ADLs without using AD. As per today's evaluation, patient required SBA for Bed Mob, Patient attempted twice for transfers as her L knee buckled in first attempt and required Min A and RW for transfers and ambulation of around 16ft. Pt will continue to benefit from skilled PT intervention during stay at [facility] and current recommendation is home with HHPT unless pt continues to decline during stay.    Electronically signed by Callie Lieberman PT at 23     Callie Lieberman PT at 23  Version 1 of 1         Patient Name: Lexis Hernandez  : 1980    MRN: [id number]                              Today's Date: 2023       Admit Date: 2023    Visit Dx:     ICD-10-CM ICD-9-CM   1. Left-sided weakness  R53.1 728.87     Patient Active Problem List   Diagnosis   • Left-sided weakness   • Right sided weakness   • Tension type headache   • Trigeminal neuralgia   • Primary hypertension   • Primary osteoarthritis   • Vertebral bodies impingement syndrome   • Bulging lumbar disc     Past Medical History:   Diagnosis Date   • Hypertension    • Left bundle branch block (LBBB)    • Trigeminal neuralgia      Past Surgical History:   Procedure Laterality Date   •  SECTION     •  SECTION     • EYE SURGERY Bilateral     for lazy eye   • KNEE ARTHROSCOPY W/ MENISCAL REPAIR Right    • LAPAROSCOPIC TUBAL LIGATION         • VAGINAL BIRTH AFTER  SECTION     • VAGINAL REPAIR  2012    vaginal wall repair      General Information     Row Name 23          Physical Therapy Time and Intention    Document Type evaluation  -PG     Mode of Treatment physical therapy  -PG     Row Name 23          General Information    Patient Profile Reviewed yes  -PG     Prior Level of Function independent:  -PG     Existing  Precautions/Restrictions fall  -PG     Barriers to Rehab none identified  -PG     Row Name 04/08/23 1943          Living Environment    People in Home spouse;child(jj), dependent  -PG     Row Name 04/08/23 1943          Cognition    Orientation Status (Cognition) oriented x 4  -PG     Row Name 04/08/23 1943          Safety Issues, Functional Mobility    Safety Issues Affecting Function (Mobility) safety precaution awareness  -PG     Impairments Affecting Function (Mobility) balance;postural/trunk control;endurance/activity tolerance;strength  -PG           User Key  (r) = Recorded By, (t) = Taken By, (c) = Cosigned By    Initials Name Provider Type    PG Callie Lieberman, PT Physical Therapist               Mobility     Row Name 04/08/23 1945          Bed Mobility    Bed Mobility bed mobility (all) activities  -PG     All Activities, [place] (Bed Mobility) standby assist  -PG     Assistive Device (Bed Mobility) head of bed elevated  -PG     Row Name 04/08/23 1945          Sit-Stand Transfer    Sit-Stand [place] (Transfers) minimum assist (75% patient effort)  -PG     Assistive Device (Sit-Stand Transfers) walker, front-wheeled  -PG     Row Name 04/08/23 1945          Gait/Stairs (Locomotion)    [place] Level (Gait) minimum assist (75% patient effort)  -PG     Assistive Device (Gait) walker, front-wheeled  -PG     Distance in Feet (Gait) 16FT  -PG     Left Sided Gait Deviations knee buckling, left side  -PG           User Key  (r) = Recorded By, (t) = Taken By, (c) = Cosigned By    Initials Name Provider Type    PG Callie Lieberman PT Physical Therapist               Obj/Interventions     Row Name 04/08/23 1946          Range of Motion Comprehensive    General Range of Motion bilateral upper extremity ROM WFL;bilateral lower extremity ROM WFL  -PG     Row Name 04/08/23 1946          Strength Comprehensive (MMT)    Comment, General Manual Muscle Testing (MMT) Assessment LUE grossly 3+/5, LLE grossly  3+/5, RLE grossly  4/5  -PG     Row Name 04/08/23 1946          Balance    Balance Assessment sitting static balance;sitting dynamic balance;sit to stand dynamic balance;standing dynamic balance  -PG     Static Sitting Balance standby assist  -PG     Dynamic Sitting Balance minimal assist;contact guard  -PG     Position, Sitting Balance sitting edge of bed;unsupported  -PG     Sit to Stand Dynamic Balance minimal assist  -PG     Dynamic Standing Balance minimal assist  -PG     Position/Device Used, Standing Balance walker, rolling  -PG           User Key  (r) = Recorded By, (t) = Taken By, (c) = Cosigned By    Initials Name Provider Type    PG Callie Lieberman, PT Physical Therapist               Goals/Plan     Row Name 04/08/23 1958          Bed Mobility Goal 1 (PT)    Activity/Assistive Device (Bed Mobility Goal 1, PT) bed mobility activities, all  -PG     [place] Level/Cues Needed (Bed Mobility Goal 1, PT) independent  -PG     Time Frame (Bed Mobility Goal 1, PT) long term goal (LTG);2 weeks  -PG     Row Name 04/08/23 1958          Transfer Goal 1 (PT)    Activity/Assistive Device (Transfer Goal 1, PT) transfers, all  -PG     [place] Level/Cues Needed (Transfer Goal 1, PT) modified independence  -PG     Time Frame (Transfer Goal 1, PT) long term goal (LTG);2 weeks  -PG     Row Name 04/08/23 1958          Gait Training Goal 1 (PT)    Activity/Assistive Device (Gait Training Goal 1, PT) gait (walking locomotion)  -PG     [place] Level (Gait Training Goal 1, PT) modified independence  -PG     Distance (Gait Training Goal 1, PT) 200ft  -PG     Time Frame (Gait Training Goal 1, PT) long term goal (LTG);2 weeks  -PG     Row Name 04/08/23 1958          Problem Specific Goal 1 (PT)    Problem Specific Goal 1 (PT) to improve strength  -PG     Time Frame (Problem Specific Goal 1, PT) long-term goal (LTG);2 weeks  -PG     Row Name 04/08/23 1958          Therapy Assessment/Plan (PT)    Planned Therapy  Interventions (PT) balance training;bed mobility training;gait training;home exercise program;neuromuscular re-education;patient/family education;postural re-education;transfer training;strengthening;ROM (range of motion)  -PG           User Key  (r) = Recorded By, (t) = Taken By, (c) = Cosigned By    Initials Name Provider Type    Callie Newby, PT Physical Therapist               Clinical Impression     Row Name 04/08/23 1950          Pain    Pretreatment Pain Rating 0/10 - no pain  -PG     Posttreatment Pain Rating 0/10 - no pain  -PG     Row Name 04/08/23 1950          Plan of Care Review    Plan of Care Reviewed With patient  -PG     Progress improving  -PG     Outcome Evaluation Patient 43 yo female admitted to the hospital with the complaint of dizziness and left side weakness. CT head reviewed and normal. CTA did not show any significant large vessel occlusion. MRI brain showed no acute abnormality. Recent Dx includes Left-sided weakness. At baseline, patient resides with spouse in a house and is independent in mobility and ADLs without using AD. As per today's evaluation, patient required SBA for Bed Mob, Patient attempted twice for transfers as her L knee buckled in first attempt and required Min A and RW for transfers and ambulation of around 16ft. Pt will continue to benefit from skilled PT intervention during stay at [facility] and current recommendation is home with HHPT unless pt continues to decline during stay.  -PG     Row Name 04/08/23 1950          Therapy Assessment/Plan (PT)    Patient/Family Therapy Goals Statement (PT) to return to PLOF  -PG     Rehab Potential (PT) good, to achieve stated therapy goals  -PG     Criteria for Skilled Interventions Met (PT) yes;skilled treatment is necessary  -PG     Therapy Frequency (PT) 5 times/wk  -PG     Predicted Duration of Therapy Intervention (PT) until d/c  -PG     Row Name 04/08/23 1950          Vital Signs    Pre Systolic BP Rehab 129  -PG     Pre  Treatment Diastolic BP 76  -PG     Post Systolic BP Rehab 133  -PG     Post Treatment Diastolic BP 68  -PG     Pretreatment Heart Rate (beats/min) 69  -PG     Posttreatment Heart Rate (beats/min) 71  -PG     Pretreatment Resp Rate (breaths/min) 15  -PG     Posttreatment Resp Rate (breaths/min) 14  -PG     Pre SpO2 (%) 100  -PG     O2 Delivery Pre Treatment room air  -PG     Post SpO2 (%) 100  -PG     O2 Delivery Post Treatment room air  -PG     Row Name 04/08/23 1950          Positioning and Restraints    Pre-Treatment Position in bed  -PG     Post Treatment Position bed  -PG     In Bed notified nsg;call light within reach;encouraged to call for assist;exit alarm on  -PG           User Key  (r) = Recorded By, (t) = Taken By, (c) = Cosigned By    Initials Name Provider Type    PG Callie Lieberman, PT Physical Therapist               Outcome Measures     Row Name 04/08/23 1959          How much help from another person do you currently need...    Turning from your back to your side while in flat bed without using bedrails? 4  -PG     Moving from lying on back to sitting on the side of a flat bed without bedrails? 3  -PG     Moving to and from a bed to a chair (including a wheelchair)? 3  -PG     Standing up from a chair using your arms (e.g., wheelchair, bedside chair)? 3  -PG     Climbing 3-5 steps with a railing? 2  -PG     To walk in hospital room? 3  -PG     AM-PAC 6 Clicks Score (PT) 18  -PG     Highest level of mobility 6 --> Walked 10 steps or more  -PG     Row Name 04/08/23 1959          Modified Conway Scale    Modified Conway Scale 4 - Moderately severe disability.  Unable to walk without assistance, and unable to attend to own bodily needs without assistance.  -PG     Row Name 04/08/23 1959          Functional Assessment    Outcome Measure Options AM-PAC 6 Clicks Basic Mobility (PT);Modified [place]  -PG           User Key  (r) = Recorded By, (t) = Taken By, (c) = Cosigned By    Initials Name Provider Type     Callie Newby, PT Physical Therapist                             Physical Therapy Education     Title: PT OT SLP Therapies (Done)     Topic: Physical Therapy (Done)     Point: Mobility training (Done)     Learning Progress Summary           Patient Acceptance, E, VU by PG at 4/8/2023 2000                   Point: Home exercise program (Done)     Learning Progress Summary           Patient Acceptance, E, VU by PG at 4/8/2023 2000                   Point: Body mechanics (Done)     Learning Progress Summary           Patient Acceptance, E, VU by PG at 4/8/2023 2000                   Point: Precautions (Done)     Learning Progress Summary           Patient Acceptance, E, VU by PG at 4/8/2023 2000                               User Key     Initials Effective Dates Name Provider Type Discipline    PG 09/01/22 -  Callie Lieberman PT Physical Therapist PT              PT Recommendation and Plan  Planned Therapy Interventions (PT): balance training, bed mobility training, gait training, home exercise program, neuromuscular re-education, patient/family education, postural re-education, transfer training, strengthening, ROM (range of motion)  Plan of Care Reviewed With: patient  Progress: improving  Outcome Evaluation: Patient 43 yo female admitted to the hospital with the complaint of dizziness and left side weakness. CT head reviewed and normal. CTA did not show any significant large vessel occlusion. MRI brain showed no acute abnormality. Recent Dx includes Left-sided weakness. At baseline, patient resides with spouse in a house and is independent in mobility and ADLs without using AD. As per today's evaluation, patient required SBA for Bed Mob, Patient attempted twice for transfers as her L knee buckled in first attempt and required Min A and RW for transfers and ambulation of around 16ft. Pt will continue to benefit from skilled PT intervention during stay at [facility] and current recommendation is home with HHPT unless pt  continues to decline during stay.     Time Calculation:    PT Charges     Row Name 23             Time Calculation    Start Time 1323  -PG      Stop Time 1350  -PG      Time Calculation (min) 27 min  -PG      PT Received On 23  -PG      PT - Next Appointment 04/10/23  -PG      PT Goal Re-Cert Due Date 23  -PG         Time Calculation- PT    Total Timed Code Minutes- PT 0 minute(s)  -PG            User Key  (r) = Recorded By, (t) = Taken By, (c) = Cosigned By    Initials Name Provider Type    PG Callie Lieberman, PT Physical Therapist              Therapy Charges for Today     Code Description Service Date Service Provider Modifiers Qty    [id number] HC PT EVAL MOD COMPLEXITY 4 2023 Callie Lieberman, PT GP 1          PT G-Codes  Outcome Measure Options: AM-PAC 6 Clicks Basic Mobility (PT), Modified Abbey  AM-PAC 6 Clicks Score (PT): 18  Modified Abbey Scale: 4 - Moderately severe disability.  Unable to walk without assistance, and unable to attend to own bodily needs without assistance.  PT Discharge Summary  Anticipated Discharge Disposition (PT): home with home health    Callie Lieberman, DEBORAH  2023      Electronically signed by Callie Lieberman PT at 23          [facility]  [address] [place] [address]  Phone:  [phone]  Fax:  [phone] Date: 2023      Ambulatory Referral to Physical Therapy Evaluate and treat     Patient:  Lexis Hernandez MRN:  [id number]   [address] [place] [address] [place] [address] :  1980  SSN:    Phone: [phone] Sex:  F      INSURANCE PAYOR PLAN GROUP # SUBSCRIBER ID   Primary:    [employer] MEDICA[employer] [id number]      Referring Provider Information:  KAREN BONILLA Phone: [phone] Fax: [phone]       Referral Information:   # Visits:  1 Referral Type: Physical Therapy [AE1]   Urgency:  Routine Referral Reason: Specialty Services Required   Start Date: 2023  End Date:  To be determined by Insurer   Diagnosis: Left-sided weakness (R53.1 [ICD-10-CM] 728.87 [ICD-9-CM])  Bulging lumbar disc (M51.36 [ICD-10-CM] 722.52 [ICD-9-CM])  Bulging of lumbar intervertebral disc without myelopathy (M51.36 [ICD-10-CM] 722.52 [ICD-9-CM])      Refer to Dept:   Refer to Provider:   Refer to Provider Phone:   Refer to Facility:       Specialty needed: Evaluate and treat  Follow-up needed: Yes     This document serves as a request of services and does not constitute Insurance authorization or approval of services.  To determine eligibility, please contact the members Insurance carrier to verify and review coverage.     If you have medical questions regarding this request for services. Please contact [facility] at [phone] during normal business hours.        Verbal Order Mode: Verbal with readback   Authorizing Provider: Travis Carvalho PA  Authorizing Provider's NPI: [id number]     Order Entered By: Isabel Lo RN 4/9/2023  1:09 PM

## 2023-04-09 NOTE — CASE MANAGEMENT/SOCIAL WORK
Continued Stay Note  [facility]     Patient Name: Lexis Hernandez  MRN: [id number]  Today's Date: 4/9/2023    Admit Date: 4/7/2023    Plan: Plan to return home. Referrals in place for [facility] and OPPT at [facility]. Orders in place. Acceptance pending.   Discharge Plan     Row Name 04/09/23 1327       Plan    Plan Plan to return home. Referrals in place for [facility] and OPPT at [facility]. Orders in place. Acceptance pending.    Patient/Family in Agreement with Plan yes    Plan Comments RN notified CM of Neurosurgery PA requesting OPPT and Pain management clinic. Orders obtained, and referrals placed; acceptance pendind. Information on AVS. Pt instructed to call Monday to schedule appointments.    Final Discharge Disposition Code 01 - home or self-care    Final Note home with spouse              Isabel Diehl RN  Weekend   [address] [address]  Office: [phone]  Fax: [phone]  [email or web address]

## 2023-04-09 NOTE — DISCHARGE INSTR - OTHER ORDERS
PLEASE CALL Monday 4/10 TO ARRANGE THE FOLLOWING APPOINTMENTS:    [facility]    [phone]  [facility] [place] [address]      [facility]   [phone]  [address] [place] [facility] [place] [address]

## 2023-04-09 NOTE — DISCHARGE PLACEMENT REQUEST
"Cheri Owens (42 y.o. Female)     Date of Birth   1980    Social Security Number       Address   [address] [place] [facility]    Home Phone   [phone]    MRN   [id number]       Gnosticist   Islam    Marital Status                               Admission Date   23    Admission Type   Emergency    Admitting Provider   Jc Macedo MD    Attending Provider   Jc Macedo MD    Department, Room/Bed   [facility] NEURO HEART, 258/       Discharge Date       Discharge Disposition   Home or Self Care    Discharge Destination                               Attending Provider: Jc Macedo MD    Allergies: Adhesive Tape    Isolation: None   Infection: None   Code Status: CPR    Ht: 165.1 cm (65\")   Wt: 94.6 kg (208 lb 8.9 oz)    Admission Cmt: None   Principal Problem: Left-sided weakness [R53.1]                 Active Insurance as of 2023     Primary Coverage     Payor Plan Insurance Group Employer/Plan Group    [employer] MEDICAID [place] MED[place] [id number]     Payor Plan Address Payor Plan Phone Number Payor Plan Fax Number Effective Dates    [address] [address] [phone]  2020 - None Entered    [facility] [id number]       Subscriber Name Subscriber Birth Date Member ID       CHERI OWENS 1980 [id number]                 Emergency Contacts      (Rel.) Home Phone Work Phone Mobile Phone    VIVIAN MARTIN (Spouse) [phone]               History & Physical      Jc Macedo MD at 23 1909              [facility] Medicine Services      Patient Name: Cheri Owens  : 1980  MRN: [id number]  Primary Care Physician:  Nataly Barr APRN  Date of admission: 2023      Subjective       Chief Complaint: Bilateral lower extremity weakness, headache, dizziness, nausea and unsteady gait.    History of Present Illness: Cheri Owens is a 42 y.o. " female who presented to [facility] on 4/7/2023 complaining of bilateral lower extremity weakness, headache, nausea and unsteady gait including dragging her feet.  Patient is a 42-year-old female with past medical history of trigeminal neuralgia, osteoarthritis of the knees status post left knee replacement and a primary hypertension who presented to the emergency room because of a sudden onset of a neurologic symptoms.  Patient reported that her symptoms started around 0800 on the day of presentation with right-sided weakness and then shifted to left-sided weakness associated with headache, dizziness, gait abnormality including dragging of her feet.  It was also noted that patient had some numbness on the right side before switching to the left.  The  noted that she started having a frontal headache before the headache became diffused.  Patient reported improvement in her symptoms at the time she was seen but still having difficulties with gait.  A code stroke was called in the emergency room and neurology consult was completed.  CT scan of the head and brain and MRI were completed and were negative.  CT angiogram of the head and the neck were completed and did not show any major vessel stenosis, thrombosis or plaques.    Patient reported no fever or chills, no visual obscurations, no hot or cold intolerance, no constipation or diarrhea, no chest pain or abdominal pain and no leg swelling or leg pain.      Review of Systems   Constitutional: Negative.   Eyes: Negative.    Cardiovascular: Negative.    Respiratory: Negative.    Endocrine: Negative.    Hematologic/Lymphatic: Negative.    Skin: Negative.    Musculoskeletal: Negative.    Gastrointestinal: Negative.    Genitourinary: Negative.    Neurological: Positive for dizziness, focal weakness, headaches and loss of balance.   Psychiatric/Behavioral: Negative.    Allergic/Immunologic: Negative.           Personal History     Past medical history:  1.   Primary hypertension.  2.  Trigeminal neuralgia.  3.  Primary osteoarthritis of the knee.      Past surgical history:  1.  Left knee arthroplasty.      Family History:   Father: Unknown.  Mother: Anxiety and depression        Social History:     1.  No tobacco.  2.  Occasional alcohol.  3.  No IV drug use.  4.  Patient lives at home with her family,  and kids.      Home Medications:  Prior to Admission Medications     Prescriptions Last Dose Informant Patient Reported? Taking?    buPROPion XL (WELLBUTRIN XL) 150 MG 24 hr tablet 4/7/2023  Yes Yes    Take 1 tablet by mouth Daily.    lamoTRIgine (LaMICtal) 25 MG tablet 4/7/2023  Yes Yes    Take 1 tablet by mouth 2 (Two) Times a Day.    lisinopril (PRINIVIL,ZESTRIL) 10 MG tablet 4/7/2023  Yes Yes    Take 1 tablet by mouth Daily.    OXcarbazepine (TRILEPTAL) 150 MG tablet 4/7/2023  Yes Yes    Take 3 tablets by mouth 2 (Two) Times a Day.            Allergies:  Allergies   Allergen Reactions   • Adhesive Tape Rash       Objective       Vitals:   Temp:  [98.5 °F (36.9 °C)] 98.5 °F (36.9 °C)  Heart Rate:  [64-69] 68  Resp:  [18] 18  BP: (132-159)/(76-94) 132/84    Physical Exam  Vitals reviewed.   Constitutional:       General: She is not in acute distress.  HENT:      Head: Normocephalic and atraumatic.      Nose: Nose normal. No congestion or rhinorrhea.      Mouth/Throat:      Mouth: Mucous membranes are moist.      Pharynx: Oropharynx is clear. No oropharyngeal exudate or posterior oropharyngeal erythema.   Eyes:      Pupils: Pupils are equal, round, and reactive to light.   Cardiovascular:      Pulses: Normal pulses.      Heart sounds: Normal heart sounds. No murmur heard.    No friction rub. No gallop.      Comments: S1 and S2 present.  No tachycardia.  Pulmonary:      Effort: No respiratory distress.      Breath sounds: No wheezing, rhonchi or rales.   Chest:      Chest wall: No tenderness.   Abdominal:      General: Abdomen is flat. Bowel sounds are normal.  There is no distension.      Palpations: Abdomen is soft.      Tenderness: There is no abdominal tenderness. There is no right CVA tenderness or guarding.   Musculoskeletal:         General: No swelling, tenderness, deformity or signs of injury.      Cervical back: Neck supple. No tenderness.      Right lower leg: No edema.      Left lower leg: No edema.   Skin:     Capillary Refill: Capillary refill takes less than 2 seconds.      Coloration: Skin is not jaundiced.      Findings: No bruising, lesion or rash.   Neurological:      Mental Status: She is alert and oriented to person, place, and time.      Comments: No facial asymmetry noted.  Gait and station not tested.  Strength reduced on both sides left worse than the right.  Strength 3/5 on the left and 4/5 on the right.  Lower extremities not tested.  Patient was alert and oriented.   Psychiatric:         Behavior: Behavior normal.            Result Review    Result Review:  I have personally reviewed the results from the time of this admission to 4/7/2023 19:33 EDT and agree with these findings:  []  Laboratory  []  Microbiology  []  Radiology  []  EKG/Telemetry   []  Cardiology/Vascular   []  Pathology  []  Old records  []  Other:  Most notable findings include:       Assessment & Plan        Active Hospital Problems:  Active Hospital Problems    Diagnosis    • **Left-sided weakness    • Right sided weakness    • Tension type headache    • Trigeminal neuralgia    • Primary hypertension    • Primary osteoarthritis            Plan:        -Continue appropriate patient's home medications for other chronic medical conditions.  -Continue the present level of care.  -Patient and family agreed with the plan of care.  -Follow neurology recommendations.  -Continue stroke protocol.  -Treat hypertension with lisinopril and hydralazine as needed.      DVT prophylaxis:  Mechanical DVT prophylaxis orders are present.    CODE STATUS:    Level Of Support Discussed With:  Patient  Code Status (Patient has no pulse and is not breathing): CPR (Attempt to Resuscitate)  Medical Interventions (Patient has pulse or is breathing): Full Support  Release to patient: Routine Release    Admission Status:  I believe this patient meets observation status.    I discussed the patient's findings and my recommendations with patient, family, nursing staff, primary care team and consulting provider.    This patient has been examined wearing appropriate Personal Protective Equipment and discussed with hospital infection control department, [facility], infectious disease specialist and pulmonologist. 23      Signature:Electronically signed by Jc Macedo MD, FACP, 23, 7:09 PM EDT.    Electronically signed by Jc Macedo MD at 23 1938          Physician Progress Notes (last 24 hours)      Jc Macedo MD at 23 1728              [facility] Medicine Services Daily Progress Note    Patient Name: Lexis Hernandez  : 1980  MRN: [id number]  Primary Care Physician:  Nataly Barr APRN  Date of admission: 2023      Subjective       Chief Complaint: Left lower extremity weakness.      Patient Reports:      2023.  Patient was seen and examined.  Patient reported slight improvement in her symptoms.    Review of Systems   Constitutional: Positive for malaise/fatigue.   HENT: Negative.    Eyes: Negative.    Cardiovascular: Negative.    Respiratory: Negative.    Endocrine: Negative.    Hematologic/Lymphatic: Negative.    Skin: Negative.    Musculoskeletal: Negative.    Gastrointestinal: Negative.    Genitourinary: Negative.    Neurological: Negative.    Psychiatric/Behavioral: Negative.    Allergic/Immunologic: Negative.             Objective       Vitals:   Temp:  [97.4 °F (36.3 °C)-98.8 °F (37.1 °C)] 98.1 °F (36.7 °C)  Heart Rate:  [56-77] 77  Resp:  [13-18] 15  BP: (104-133)/(61-77) 133/68    Physical Exam  Vitals  reviewed.   Constitutional:       General: She is not in acute distress.  HENT:      Head: Normocephalic and atraumatic.      Nose: Nose normal. No congestion or rhinorrhea.      Mouth/Throat:      Mouth: Mucous membranes are moist.      Pharynx: Oropharynx is clear. No oropharyngeal exudate or posterior oropharyngeal erythema.   Eyes:      Pupils: Pupils are equal, round, and reactive to light.   Cardiovascular:      Pulses: Normal pulses.      Heart sounds: Normal heart sounds. No murmur heard.    No friction rub. No gallop.      Comments: S1 and S2 present.  No tachycardia.  Pulmonary:      Effort: No respiratory distress.      Breath sounds: No wheezing, rhonchi or rales.   Chest:      Chest wall: No tenderness.   Abdominal:      General: Abdomen is flat. Bowel sounds are normal. There is no distension.      Palpations: Abdomen is soft.      Tenderness: There is no abdominal tenderness. There is no right CVA tenderness or guarding.   Musculoskeletal:         General: No swelling, tenderness, deformity or signs of injury.      Cervical back: Neck supple. No tenderness.      Right lower leg: No edema.      Left lower leg: No edema.   Skin:     Capillary Refill: Capillary refill takes less than 2 seconds.      Coloration: Skin is not jaundiced.      Findings: No bruising, lesion or rash.   Neurological:      Mental Status: She is alert.      Comments: No facial asymmetry noted.  Gait and station not tested.   Psychiatric:      Comments: No agitation.               Result Review    Result Review:  I have personally reviewed the results from the time of this admission to 4/8/2023 17:33 EDT and agree with these findings:  [x]  Laboratory  [x]  Microbiology  [x]  Radiology  []  EKG/Telemetry   []  Cardiology/Vascular   []  Pathology  []  Old records  []  Other:  Most notable findings include:       Procedure Component Value Units Date/Time    MRI Lumbar Spine Without Contrast [[id number]] Isra as Reviewed   Order  Status: Completed Collected: 04/08/23 1228    Updated: 04/08/23 1243   Narrative:     MRI LUMBAR SPINE WO CONTRAST     Date of Exam: 4/8/2023 11:20 AM EDT     Indication: Ataxia or coordination problem (Ped 0-17y).       Comparison: None available.     Technique:  Routine multiplanar/multisequence sequence images of the lumbar spine were obtained without contrast administration.       Findings:   T12-L1: No disc protrusion or extrusion. No central canal or foraminal stenosis     L-1-L2: Left paracentral/foraminal protrusion. No central nerve root compression. Mild left foraminal stenosis     L2-L3: Mild diffuse disc bulge. No significant central canal or foraminal stenosis. Focal T2 signal in the left extra foraminal annulus compatible with annular fissure. Incidental bilateral nerve root sleeve cysts     L3-L4: Diffuse disc bulge. Bilateral facet arthropathy. Mild central canal stenosis. Mild bilateral foraminal stenosis. Focal T2 signal in the left extraforaminal annulus compatible with annular fissure. Incidental bilateral nerve root sleeve cysts     L4-L5: Mild diffuse disc bulge. Bilateral facet arthropathy resulting in slight L4 anterolisthesis. No significant central canal stenosis. Moderate bilateral foraminal stenosis. Focal T2 signal in the left extraforaminal annulus compatible with annular   fissure. Incidental bilateral nerve root sleeve cysts     L5-S1: Broad-based central/left paracentral/left foraminal disc bulge. Left facet arthropathy. The bulging disc contacts the left S1 nerve root as it exits the thecal sac without obvious compression. No thecal sac compression. Moderate left foraminal   stenosis. Incidental right-sided nerve root sleeve cyst     Conus normal in appearance terminating at L1-L2. No abnormality of the cauda equina. Normal generalized marrow signal. Incidental sacral Tarlov cysts. No paraspinal mass or fluid collection. Aorta normal in caliber    Impression:     Impression:    Multilevel lumbar degenerative disease     Electronically Signed: Manish Ballard    4/8/2023 12:41 PM EDT                Assessment & Plan    From previous notes and with minor updates.    Brief Patient Summary:    Patient is a 42-year-old female with past medical history of obesity, trigeminal neuralgia, osteoarthritis of the knees status post left knee replacement and a primary hypertension who presented to the emergency room because of a sudden onset of a neurologic symptoms.  Patient reported that her symptoms started around 0800 on the day of presentation with right-sided weakness and then shifted to left-sided weakness associated with headache, dizziness, gait abnormality including dragging of her feet.  It was also noted that patient had some numbness on the right side before switching to the left.  The  noted that she started having a frontal headache before the headache became diffused.  Patient reported improvement in her symptoms at the time she was seen but still having difficulties with gait.  A code stroke was called in the emergency room and neurology consult was completed.  CT scan of the head and brain and MRI were completed and were negative.  CT angiogram of the head and the neck were completed and did not show any major vessel stenosis, thrombosis or plaques.           aspirin, 325 mg, Oral, Daily   Or  aspirin, 300 mg, Rectal, Daily  atorvastatin, 80 mg, Oral, Nightly  buPROPion XL, 150 mg, Oral, Daily  lamoTRIgine, 25 mg, Oral, BID  lisinopril, 10 mg, Oral, Daily  OXcarbazepine, 450 mg, Oral, BID  senna-docusate sodium, 2 tablet, Oral, BID  sodium chloride, 10 mL, Intravenous, Q12H  sodium chloride, 10 mL, Intravenous, Q12H       sodium chloride, 100 mL/hr, Last Rate: 100 mL/hr (04/08/23 1048)         Active Hospital Problems:  Active Hospital Problems    Diagnosis    • **Left-sided weakness    • Vertebral bodies impingement syndrome    • Bulging lumbar disc    • Right sided weakness     • Tension type headache    • Trigeminal neuralgia    • Primary hypertension    • Primary osteoarthritis      Plan:-Continue appropriate patient's home medications for the chronic medical conditions.  -Continue the present level of care.  -Patient and family agreed with the plan of care.  -Completed neurosurgery consulted because of a bulging disc in L5-S1 causing possible impingement syndrome.      DVT prophylaxis:  Mechanical DVT prophylaxis orders are present.    CODE STATUS:    Level Of Support Discussed With: Patient  Code Status (Patient has no pulse and is not breathing): CPR (Attempt to Resuscitate)  Medical Interventions (Patient has pulse or is breathing): Full Support  Release to patient: Routine Release      Disposition: This wonderful patient can discharge in the next 24 to 48 hours.    This patient has been examined wearing appropriate Personal Protective Equipment and discussed with hospital infection control department, [facility], infectious disease specialist and pulmonologist. 04/08/23      Electronically signed by Jc Macedo MD, FACP, 04/08/23, 17:33 EDT.      [facility] Hospitalist Team             Electronically signed by Jc Macedo MD at 04/08/23 6296          Physical Therapy Notes (last 24 hours)      Callie Lieberman, PT at 04/08/23 2000  Version 1 of 1       Goal Outcome Evaluation:  Plan of Care Reviewed With: patient        Progress: improving  Outcome Evaluation: Patient 41 yo female admitted to the hospital with the complaint of dizziness and left side weakness. CT head reviewed and normal. CTA did not show any significant large vessel occlusion. MRI brain showed no acute abnormality. Recent Dx includes Left-sided weakness. At baseline, patient resides with spouse in a house and is independent in mobility and ADLs without using AD. As per today's evaluation, patient required SBA for Bed Mob, Patient attempted twice for transfers as her L knee buckled in  first attempt and required Min A and RW for transfers and ambulation of around 16ft. Pt will continue to benefit from skilled PT intervention during stay at [facility] and current recommendation is home with HHPT unless pt continues to decline during stay.    Electronically signed by Callie Lieberman PT at 23     Callie Lieberman, PT at 23  Version 1 of 1         Patient Name: Lexis Hernandez  : 1980    MRN: [id number]                              Today's Date: 2023       Admit Date: 2023    Visit Dx:     ICD-10-CM ICD-9-CM   1. Left-sided weakness  R53.1 728.87     Patient Active Problem List   Diagnosis   • Left-sided weakness   • Right sided weakness   • Tension type headache   • Trigeminal neuralgia   • Primary hypertension   • Primary osteoarthritis   • Vertebral bodies impingement syndrome   • Bulging lumbar disc     Past Medical History:   Diagnosis Date   • Hypertension    • Left bundle branch block (LBBB)    • Trigeminal neuralgia      Past Surgical History:   Procedure Laterality Date   •  SECTION     •  SECTION     • EYE SURGERY Bilateral     for lazy eye   • KNEE ARTHROSCOPY W/ MENISCAL REPAIR Right    • LAPAROSCOPIC TUBAL LIGATION         • VAGINAL BIRTH AFTER  SECTION     • VAGINAL REPAIR  2012    vaginal wall repair      General Information     Row Name 23          Physical Therapy Time and Intention    Document Type evaluation  -PG     Mode of Treatment physical therapy  -PG     Row Name 23          General Information    Patient Profile Reviewed yes  -PG     Prior Level of Function independent:  -PG     Existing Precautions/Restrictions fall  -PG     Barriers to Rehab none identified  -PG     Row Name 23          Living Environment    People in Home spouse;child(jj), dependent  -PG     Row Name 23          Cognition    Orientation Status (Cognition) oriented x 4  -PG      Row Name 04/08/23 1943          Safety Issues, Functional Mobility    Safety Issues Affecting Function (Mobility) safety precaution awareness  -PG     Impairments Affecting Function (Mobility) balance;postural/trunk control;endurance/activity tolerance;strength  -PG           User Key  (r) = Recorded By, (t) = Taken By, (c) = Cosigned By    Initials Name Provider Type    Callie Newby PT Physical Therapist               Mobility     Row Name 04/08/23 1945          Bed Mobility    Bed Mobility bed mobility (all) activities  -PG     All Activities, [place] (Bed Mobility) standby assist  -PG     Assistive Device (Bed Mobility) head of bed elevated  -PG     Row Name 04/08/23 1945          Sit-Stand Transfer    Sit-Stand [place] (Transfers) minimum assist (75% patient effort)  -PG     Assistive Device (Sit-Stand Transfers) walker, front-wheeled  -PG     Row Name 04/08/23 1945          Gait/Stairs (Locomotion)    [place] Level (Gait) minimum assist (75% patient effort)  -PG     Assistive Device (Gait) walker, front-wheeled  -PG     Distance in Feet (Gait) 16FT  -PG     Left Sided Gait Deviations knee buckling, left side  -PG           User Key  (r) = Recorded By, (t) = Taken By, (c) = Cosigned By    Initials Name Provider Type    PG Callie Lieberman PT Physical Therapist               Obj/Interventions     Row Name 04/08/23 1946          Range of Motion Comprehensive    General Range of Motion bilateral upper extremity ROM WFL;bilateral lower extremity ROM WFL  -PG     Row Name 04/08/23 1946          Strength Comprehensive (MMT)    Comment, General Manual Muscle Testing (MMT) Assessment LUE grossly 3+/5, LLE grossly 3+/5, RLE grossly  4/5  -PG     Row Name 04/08/23 1946          Balance    Balance Assessment sitting static balance;sitting dynamic balance;sit to stand dynamic balance;standing dynamic balance  -PG     Static Sitting Balance standby assist  -PG     Dynamic Sitting Balance minimal  assist;contact guard  -PG     Position, Sitting Balance sitting edge of bed;unsupported  -PG     Sit to Stand Dynamic Balance minimal assist  -PG     Dynamic Standing Balance minimal assist  -PG     Position/Device Used, Standing Balance walker, rolling  -PG           User Key  (r) = Recorded By, (t) = Taken By, (c) = Cosigned By    Initials Name Provider Type    PG Callie Lieberman, PT Physical Therapist               Goals/Plan     Row Name 04/08/23 1958          Bed Mobility Goal 1 (PT)    Activity/Assistive Device (Bed Mobility Goal 1, PT) bed mobility activities, all  -PG     [place] Level/Cues Needed (Bed Mobility Goal 1, PT) independent  -PG     Time Frame (Bed Mobility Goal 1, PT) long term goal (LTG);2 weeks  -PG     Row Name 04/08/23 1958          Transfer Goal 1 (PT)    Activity/Assistive Device (Transfer Goal 1, PT) transfers, all  -PG     [place] Level/Cues Needed (Transfer Goal 1, PT) modified independence  -PG     Time Frame (Transfer Goal 1, PT) long term goal (LTG);2 weeks  -PG     Row Name 04/08/23 1958          Gait Training Goal 1 (PT)    Activity/Assistive Device (Gait Training Goal 1, PT) gait (walking locomotion)  -PG     [place] Level (Gait Training Goal 1, PT) modified independence  -PG     Distance (Gait Training Goal 1, PT) 200ft  -PG     Time Frame (Gait Training Goal 1, PT) long term goal (LTG);2 weeks  -PG     Row Name 04/08/23 1958          Problem Specific Goal 1 (PT)    Problem Specific Goal 1 (PT) to improve strength  -PG     Time Frame (Problem Specific Goal 1, PT) long-term goal (LTG);2 weeks  -PG     Row Name 04/08/23 1958          Therapy Assessment/Plan (PT)    Planned Therapy Interventions (PT) balance training;bed mobility training;gait training;home exercise program;neuromuscular re-education;patient/family education;postural re-education;transfer training;strengthening;ROM (range of motion)  -PG           User Key  (r) = Recorded By, (t) = Taken By, (c) =  Cosigned By    Initials Name Provider Type    PG Callie Lieberman, PT Physical Therapist               Clinical Impression     Row Name 04/08/23 1950          Pain    Pretreatment Pain Rating 0/10 - no pain  -PG     Posttreatment Pain Rating 0/10 - no pain  -PG     Row Name 04/08/23 1950          Plan of Care Review    Plan of Care Reviewed With patient  -PG     Progress improving  -PG     Outcome Evaluation Patient 41 yo female admitted to the hospital with the complaint of dizziness and left side weakness. CT head reviewed and normal. CTA did not show any significant large vessel occlusion. MRI brain showed no acute abnormality. Recent Dx includes Left-sided weakness. At baseline, patient resides with spouse in a house and is independent in mobility and ADLs without using AD. As per today's evaluation, patient required SBA for Bed Mob, Patient attempted twice for transfers as her L knee buckled in first attempt and required Min A and RW for transfers and ambulation of around 16ft. Pt will continue to benefit from skilled PT intervention during stay at [facility] and current recommendation is home with HHPT unless pt continues to decline during stay.  -PG     Row Name 04/08/23 1950          Therapy Assessment/Plan (PT)    Patient/Family Therapy Goals Statement (PT[facility]  -[facility]     Rehab Potential (PT) good, to achieve stated therapy goals  -PG     Criteria for Skilled Interventions Met (PT) yes;skilled treatment is necessary  -PG     Therapy Frequency (PT) 5 times/wk  -PG     Predicted Duration of Therapy Intervention (PT) until d/c  -PG     Row Name 04/08/23 1950          Vital Signs    Pre Systolic BP Rehab 129  -PG     Pre Treatment Diastolic BP 76  -PG     Post Systolic BP Rehab 133  -PG     Post Treatment Diastolic BP 68  -PG     Pretreatment Heart Rate (beats/min) 69  -PG     Posttreatment Heart Rate (beats/min) 71  -PG     Pretreatment Resp Rate (breaths/min) 15  -PG     Posttreatment Resp Rate  (breaths/min) 14  -PG     Pre SpO2 (%) 100  -PG     O2 Delivery Pre Treatment room air  -PG     Post SpO2 (%) 100  -PG     O2 Delivery Post Treatment room air  -PG     Row Name 04/08/23 1950          Positioning and Restraints    Pre-Treatment Position in bed  -PG     Post Treatment Position bed  -PG     In Bed notified nsg;call light within reach;encouraged to call for assist;exit alarm on  -PG           User Key  (r) = Recorded By, (t) = Taken By, (c) = Cosigned By    Initials Name Provider Type    Callie Newby PT Physical Therapist               Outcome Measures     Row Name 04/08/23 1959          How much help from another person do you currently need...    Turning from your back to your side while in flat bed without using bedrails? 4  -PG     Moving from lying on back to sitting on the side of a flat bed without bedrails? 3  -PG     Moving to and from a bed to a chair (including a wheelchair)? 3  -PG     Standing up from a chair using your arms (e.g., wheelchair, bedside chair)? 3  -PG     Climbing 3-5 steps with a railing? 2  -PG     To walk in hospital room? 3  -PG     AM-PAC 6 Clicks Score (PT) 18  -PG     Highest level of mobility 6 --> Walked 10 steps or more  -PG     Row Name 04/08/23 1959          Modified Marquette Scale    Modified Abbey Scale 4 - Moderately severe disability.  Unable to walk without assistance, and unable to attend to own bodily needs without assistance.  -PG     Row Name 04/08/23 1959          Functional Assessment    Outcome Measure Options AM-PAC 6 Clicks Basic Mobility (PT);Modified Abbey  -PG           User Key  (r) = Recorded By, (t) = Taken By, (c) = Cosigned By    Initials Name Provider Type    Callie Newby PT Physical Therapist                             Physical Therapy Education     Title: PT OT SLP Therapies (Done)     Topic: Physical Therapy (Done)     Point: Mobility training (Done)     Learning Progress Summary           Patient Acceptance, E, VU by PG at  4/8/2023 2000                   Point: Home exercise program (Done)     Learning Progress Summary           Patient Acceptance, E, VU by PG at 4/8/2023 2000                   Point: Body mechanics (Done)     Learning Progress Summary           Patient Acceptance, E, VU by PG at 4/8/2023 2000                   Point: Precautions (Done)     Learning Progress Summary           Patient Acceptance, E, VU by PG at 4/8/2023 2000                               User Key     Initials Effective Dates Name Provider Type Discipline    PG 09/01/22 -  Callie Lieberman, PT Physical Therapist PT              PT Recommendation and Plan  Planned Therapy Interventions (PT): balance training, bed mobility training, gait training, home exercise program, neuromuscular re-education, patient/family education, postural re-education, transfer training, strengthening, ROM (range of motion)  Plan of Care Reviewed With: patient  Progress: improving  Outcome Evaluation: Patient 41 yo female admitted to the hospital with the complaint of dizziness and left side weakness. CT head reviewed and normal. CTA did not show any significant large vessel occlusion. MRI brain showed no acute abnormality. Recent Dx includes Left-sided weakness. At baseline, patient resides with spouse in a house and is independent in mobility and ADLs without using AD. As per today's evaluation, patient required SBA for Bed Mob, Patient attempted twice for transfers as her L knee buckled in first attempt and required Min A and RW for transfers and ambulation of around 16ft. Pt will continue to benefit from skilled PT intervention during stay at [facility] and current recommendation is home with HHPT unless pt continues to decline during stay.     Time Calculation:    PT Charges     Row Name 04/08/23 2001             Time Calculation    Start Time 1323  -PG      Stop Time 1350  -PG      Time Calculation (min) 27 min  -PG      PT Received On 04/08/23  -PG      PT - Next Appointment  04/10/23  -PG      PT Goal Re-Cert Due Date 04/22/23  -PG         Time Calculation- PT    Total Timed Code Minutes- PT 0 minute(s)  -PG            User Key  (r) = Recorded By, (t) = Taken By, (c) = Cosigned By    Initials Name Provider Type    PG Callie Lieberman, PT Physical Therapist              Therapy Charges for Today     Code Description Service Date Service Provider Modifiers Qty    27260966786 HC PT EVAL MOD COMPLEXITY 4 4/8/2023 Callie Lieberman, PT GP 1          PT G-Codes  Outcome Measure Options: AM-PAC 6 Clicks Basic Mobility (PT), Modified Morrill  AM-PAC 6 Clicks Score (PT): 18  Modified Morrill Scale: 4 - Moderately severe disability.  Unable to walk without assistance, and unable to attend to own bodily needs without assistance.  PT Discharge Summary  Anticipated Discharge Disposition (PT): home with home health    Callie Lieberman PT  4/8/2023      Electronically signed by Callie Lieberman PT at 04/08/23 [address] [place] [address] [facility]  [address] [facility] [place] [address]  Phone:  [phone]  Fax:  [phone] Date: Apr 9, 2023       Expand All Collapse All    Show:  []Written[]Templated[]Copied    Added by:  [x]Travis Carvalho, PA    []Claude for details                                                                                                                                                                                                                                                                                                                                                          Neurosurgery Consultation        Patient: Lexis Hernandez     Sex: female     YOB: 1980     Date of Admission: 4/7/2023  1:06 PM     Admitting Dx: Left-sided weakness [R53.1]     Reason for Consult: Left leg weakness           Subjective         CC: Left-sided LBP with left leg weakness since Friday     HPI:  Patient is a 42 y.o. female with HTN and trigeminal neuralgia  currently in the hospital due to acute left-sided weakness.  Neurosurgery was consulted due to MRI findings concerning for herniated disc.     During my evaluation patient reports she woke up 2 days ago (Friday) with left-sided weakness, particularly in her leg.  She denies injury or inciting event prior to the onset of symptoms.  This was severe at first but has somewhat improved today.  She now describes left-sided low back pain that radiates down the back of her left leg and into her foot.  Pain is associated with numbness and tingling.  She denies bowel/bladder dysfunction and saddle anesthesia.  She is asymptomatic on the right side.  She reports she has previously received epidural steroid injections at L3-4 several years ago.        PMH:  Medical History        Past Medical History:   Diagnosis Date   • Hypertension     • Left bundle branch block (LBBB)     • Trigeminal neuralgia                 Current Facility-Administered Medications:   •  acetaminophen (TYLENOL) tablet 650 mg, 650 mg, Oral, Q4H PRN, 650 mg at 04/08/23 2056 **OR** acetaminophen (TYLENOL) 160 MG/5ML solution 650 mg, 650 mg, Oral, Q4H PRN **OR** acetaminophen (TYLENOL) suppository 650 mg, 650 mg, Rectal, Q4H PRN, Jc Macedo MD  •  aluminum-magnesium hydroxide-simethicone (MAALOX MAX) 400-400-40 MG/5ML suspension 15 mL, 15 mL, Oral, Q6H PRN, Jc Macedo MD  •  aspirin tablet 325 mg, 325 mg, Oral, Daily, 325 mg at 04/09/23 0823 **OR** aspirin suppository 300 mg, 300 mg, Rectal, Daily, Jc Macedo MD  •  atorvastatin (LIPITOR) tablet 80 mg, 80 mg, Oral, Nightly, Jc Macedo MD, 80 mg at 04/08/23 2056  •  sennosides-docusate (PERICOLACE) 8.6-50 MG per tablet 2 tablet, 2 tablet, Oral, BID, 2 tablet at 04/09/23 0823 **AND** polyethylene glycol (MIRALAX) packet 17 g, 17 g, Oral, Daily PRN **AND** bisacodyl (DULCOLAX) EC tablet 5 mg, 5 mg, Oral, Daily PRN **AND** bisacodyl (DULCOLAX) suppository 10 mg, 10 mg, Rectal, Daily  PRN, Jc Macedo MD  •  buPROPion XL (WELLBUTRIN XL) 24 hr tablet 150 mg, 150 mg, Oral, Daily, Jc Macedo MD, 150 mg at 04/09/23 0823  •  calcium carbonate (TUMS) chewable tablet 500 mg (200 mg elemental), 2 tablet, Oral, TID PRN, Ana Chiu, APRN, 2 tablet at 04/09/23 0521  •  lamoTRIgine (LaMICtal) tablet 25 mg, 25 mg, Oral, BID, Jc Macedo MD, 25 mg at 04/09/23 0823  •  lisinopril (PRINIVIL,ZESTRIL) tablet 10 mg, 10 mg, Oral, Daily, Jc Macedo MD, 10 mg at 04/09/23 0822  •  nitroglycerin (NITROSTAT) SL tablet 0.4 mg, 0.4 mg, Sublingual, Q5 Min PRN, Jc Macedo MD  •  ondansetron (ZOFRAN) injection 4 mg, 4 mg, Intravenous, Q6H PRN, Jc Macedo MD, 4 mg at 04/07/23 1807  •  OXcarbazepine (TRILEPTAL) tablet 450 mg, 450 mg, Oral, BID, Jc Macedo MD, 450 mg at 04/09/23 0823  •  oxyCODONE (ROXICODONE) immediate release tablet 10 mg, 10 mg, Oral, Q4H PRN, Jc Macedo MD  •  oxyCODONE (ROXICODONE) immediate release tablet 5 mg, 5 mg, Oral, Q4H PRN, Jc Macedo MD, 5 mg at 04/09/23 0407  •  sodium chloride 0.9 % flush 10 mL, 10 mL, Intravenous, PRN, Jc Macedo MD  •  sodium chloride 0.9 % flush 10 mL, 10 mL, Intravenous, Q12H, Jc Macedo MD, 10 mL at 04/09/23 0826  •  sodium chloride 0.9 % flush 10 mL, 10 mL, Intravenous, PRN, Jc Macedo MD  •  sodium chloride 0.9 % flush 10 mL, 10 mL, Intravenous, Q12H, Jc Macedo MD, 10 mL at 04/09/23 0825  •  sodium chloride 0.9 % flush 10 mL, 10 mL, Intravenous, PRN, Jc Macedo MD  •  sodium chloride 0.9 % infusion 40 mL, 40 mL, Intravenous, PRN, Jc Macedo MD  •  sodium chloride 0.9 % infusion 40 mL, 40 mL, Intravenous, PRN, Jc Macedo MD  •  sodium chloride 0.9 % infusion, 100 mL/hr, Intravenous, Continuous, Jc Macedo MD, Held at 04/08/23 1900          Allergies   Allergen Reactions   • Adhesive Tape Rash         Surgical History         Past Surgical History:    Procedure Laterality Date   •  SECTION      •  SECTION      • EYE SURGERY Bilateral 1985     for lazy eye   • KNEE ARTHROSCOPY W/ MENISCAL REPAIR Right    • LAPAROSCOPIC TUBAL LIGATION         2013   • VAGINAL BIRTH AFTER  SECTION      • VAGINAL REPAIR   2012     vaginal wall repair            Social History   Social History            Socioeconomic History   • Marital status:    Tobacco Use   • Smoking status: Never   • Smokeless tobacco: Never   Vaping Use   • Vaping Use: Never used   Substance and Sexual Activity   • Alcohol use: Yes       Alcohol/week: 1.0 standard drink       Types: 1 Glasses of wine per week   • Drug use: Never   • Sexual activity: Yes       Partners: Male                  Family History   Problem Relation Age of Onset   • COPD Maternal Grandmother     • Heart failure Maternal Grandmother     • Cancer Maternal Grandfather              Current Medications:  Scheduled Meds:aspirin, 325 mg, Oral, Daily   Or  aspirin, 300 mg, Rectal, Daily  atorvastatin, 80 mg, Oral, Nightly  buPROPion XL, 150 mg, Oral, Daily  lamoTRIgine, 25 mg, Oral, BID  lisinopril, 10 mg, Oral, Daily  OXcarbazepine, 450 mg, Oral, BID  senna-docusate sodium, 2 tablet, Oral, BID  sodium chloride, 10 mL, Intravenous, Q12H  sodium chloride, 10 mL, Intravenous, Q12H        Continuous Infusions:sodium chloride, 100 mL/hr, Last Rate: Stopped (23 1900)        PRN Meds: •  acetaminophen **OR** acetaminophen **OR** acetaminophen  •  aluminum-magnesium hydroxide-simethicone  •  senna-docusate sodium **AND** polyethylene glycol **AND** bisacodyl **AND** bisacodyl  •  calcium carbonate  •  nitroglycerin  •  ondansetron  •  oxyCODONE  •  oxyCODONE  •  sodium chloride  •  sodium chloride  •  sodium chloride  •  sodium chloride  •  sodium chloride     ROS:  14 point review of systems was completed and is negative except for what is noted in HPI              Objective         Vitals           Vitals:     04/08/23 2129 04/09/23 0158 04/09/23 0621 04/09/23 0820   BP:   120/76 117/79 137/87   BP Location:   Left arm Left arm     Patient Position:   Lying Sitting     Pulse:     72 68   Resp: 21 15 14     Temp: 98.1 °F (36.7 °C) 97.8 °F (36.6 °C) 97.7 °F (36.5 °C)     TempSrc: Oral Oral Oral     SpO2:     97% 100%   Weight:   94.6 kg (208 lb 8.9 oz)       Height:                    Physical exam     General  - WD/WN female, appears their stated age, awake, cooperative, in no acute distress  HEENT  - Normocephalic, atraumatic, PERRLA, EOM intact  Cardiac  - RRR, no peripheral edema  Respiratory  - Normal respiratory rate and effort  Abdomen  - Soft, NT/ND  Musculoskeletal  - No joint redness, swelling, or tenderness  Skin  - Warm and dry, no bleeding, bruising, or rash  NEUROLOGIC  - A/O x3  - 4+/5 left plantarflexion  - 5-/5 bilateral hip flexion, likely secondary pain  - Otherwise full strength and sensation           RESULTS REVIEW:         Results from last 7 days   Lab Units 04/09/23  0347 04/08/23  1443 04/07/23  1329   WBC 10*3/mm3 8.50 7.40 6.10   HEMOGLOBIN g/dL 13.2 13.4 15.4   HEMATOCRIT % 38.9 39.4 48.0*   PLATELETS 10*3/mm3 283 318 372                Results from last 7 days   Lab Units 04/09/23  0347 04/08/23  1443 04/07/23  1329   SODIUM mmol/L 138 140 138   POTASSIUM mmol/L 4.0 4.2 3.9   CHLORIDE mmol/L 103 103 99   CO2 mmol/L 29.0 28.0 28.0   BUN mg/dL 8 8 6   CREATININE mg/dL 0.63 0.70 0.68   CALCIUM mg/dL 8.6 9.1 9.6   BILIRUBIN mg/dL  --   --  0.2   ALK PHOS U/L  --   --  84   ALT (SGPT) U/L  --   --  15   AST (SGOT) U/L  --   --  18   GLUCOSE mg/dL 113* 102* 87         Spine plain films from last 21 days:         CT spine results from last 21 days:           MRI spine results from the last 21 days:   MRI Lumbar Spine Without Contrast     Result Date: 4/8/2023  MRI LUMBAR SPINE WO CONTRAST Date of Exam: 4/8/2023 11:20 AM EDT Indication: Ataxia or coordination problem (Ped 0-17y).   Comparison: None available. Technique:  Routine multiplanar/multisequence sequence images of the lumbar spine were obtained without contrast administration.  Findings: T12-L1: No disc protrusion or extrusion. No central canal or foraminal stenosis L-1-L2: Left paracentral/foraminal protrusion. No central nerve root compression. Mild left foraminal stenosis L2-L3: Mild diffuse disc bulge. No significant central canal or foraminal stenosis. Focal T2 signal in the left extra foraminal annulus compatible with annular fissure. Incidental bilateral nerve root sleeve cysts L3-L4: Diffuse disc bulge. Bilateral facet arthropathy. Mild central canal stenosis. Mild bilateral foraminal stenosis. Focal T2 signal in the left extraforaminal annulus compatible with annular fissure. Incidental bilateral nerve root sleeve cysts L4-L5: Mild diffuse disc bulge. Bilateral facet arthropathy resulting in slight L4 anterolisthesis. No significant central canal stenosis. Moderate bilateral foraminal stenosis. Focal T2 signal in the left extraforaminal annulus compatible with annular fissure. Incidental bilateral nerve root sleeve cysts L5-S1: Broad-based central/left paracentral/left foraminal disc bulge. Left facet arthropathy. The bulging disc contacts the left S1 nerve root as it exits the thecal sac without obvious compression. No thecal sac compression. Moderate left foraminal stenosis. Incidental right-sided nerve root sleeve cyst Conus normal in appearance terminating at L1-L2. No abnormality of the cauda equina. Normal generalized marrow signal. Incidental sacral Tarlov cysts. No paraspinal mass or fluid collection. Aorta normal in caliber      Impression: Impression: Multilevel lumbar degenerative disease Electronically Signed: Manish Ballard  4/8/2023 12:41 PM EDT  Workstation ID: [id number]        CT head results from the last 21 days:   CT Head Without Contrast Stroke Protocol     Result Date: 4/7/2023  CT HEAD WO CONTRAST STROKE  PROTOCOL Date of Exam: 4/7/2023 1:35 PM EDT Indication: Neuro deficit, acute stroke suspected Neuro deficit, acute, stroke suspected. Comparison: 8/9/2015 Technique: Axial CT images were obtained of the head without contrast administration.  Reconstructed coronal and sagittal images were also obtained. Automated exposure control and iterative construction methods were used. Scan Time:  1341 Results discussed with the stroke team at the CT scanner at 1345. Findings: The ventricles and CSF containing spaces are normal in size and configuration. No intra or extra-axial mass lesions, fluid collections, or mass effect are seen. No focal areas of low attenuation or acute intracranial hemorrhage. The mastoid air cells and  paranasal sinuses are clear. The bony calvarium is intact.      Impression: Impression: Normal noncontrast CT of the brain. Electronically Signed: Rafael Roman  4/7/2023 1:47 PM EDT  Workstation ID: [id number]        MRI of head results from the last 21 days:   MRI Brain Without Contrast     Result Date: 4/7/2023  MRI BRAIN WO CONTRAST Date of Exam: 4/7/2023 2:19 PM EDT Indication: Dizziness, non-specific.  Comparison: None available. Technique:  Routine multiplanar/multisequence sequence images of the brain were obtained without contrast administration. Findings: The brain parenchyma is normal in volume and signal intensity on the obtained sequences. Midline structures appear unremarkable. No mass lesion, intracranial hemorrhage, or hydrocephalus is identified. Diffusion-weighted sequences demonstrate no acute infarct. Limited visualization of the intracerebral vessels appears unremarkable. The paranasal sinuses and mastoid air cells show no fluid signal. The orbits, globes, retrobulbar soft tissues appear unremarkable. The visualized superficial soft tissues and cervical spine are without significant abnormality.      Impression: Impression: No acute abnormality is identified within the brain.  Specifically, no diffusion restriction is identified to suggest acute infarct. Electronically Signed: Luc Richter  4/7/2023 2:59 PM EDT  Workstation ID: [id number]                     Assessment         MDM: This is a 42 y.o. female being evaluated by neurosurgery for acute left-sided low back pain and left leg weakness.  Imaging not consistent with stroke.  She does have a L5-S1 disc herniation with severe left neuroforaminal and lateral recess stenosis.  Her symptoms are consistent with acute lumbar radiculopathy.  She has no red flags for cauda equina syndrome and no concerning emergent neurologic deficits.  She does not require emergent neurosurgical intervention at this time.  I recommend she be discharged with a referral to outpatient physical therapy as well as to pain management for a left L5 transforaminal epidural steroid injection.  When she is completed these therapies she may follow-up in our outpatient neurosurgery clinic to discuss further treatment options.  Patient is agreeable to this plan.                 Plan         1. Lumbar radiculopathy, acute  2. Herniated disc at L5-S1  3. Degenerative disc disease, lumbosacral spine  4. Lumbar stenosis without neurogenic claudication  - No emergent neurosurgical intervention necessary at this time  - Outpatient referral to physical therapy upon discharge  - Outpatient referral to pain management for left L5 transforaminal injection  - Follow-up with me in outpatient neurosurgery clinic after completing PT and getting injections     Medical management per primary team.  Neurosurgery will sign off at this time.  Call with any questions or concerns.     I discussed my assessment and recommendations with Dr. Jessica Carvalho PA-C  4/9/2023  09:05 EDT        Part of this note may be an electronic transcription/translation of spoken language to printed text using the Dragon Dictation System.                     Cosigned by: Faisal Lopez  MD Josep at 23 1235     Revision History                                               Ambulatory Referral to Pain Management     Patient:  Lexis Hernandez MRN:  [id number]   [address] [place] [address] [place]  [place] [facility] :  1980  SSN:    Phone: [phone] Sex:  F      INSURANCE PAYOR PLAN GROUP # SUBSCRIBER ID   Primary:    [employer] [id number] [facility] [id number]      Referring Provider Information:  KAREN BONILLA Phone: [phone] Fax: [phone]       Referral Information:   # Visits:  1 Referral Type: Pain Management [7]   Urgency:  Routine Referral Reason: Specialty Services Required   Start Date: 2023 End Date:  To be determined by Insurer   Diagnosis: Left-sided weakness (R53.1 [ICD-10-CM] 728.87 [ICD-9-CM])  Primary osteoarthritis involving multiple joints (M15.9 [ICD-10-CM] 715.98 [ICD-9-CM])  Bulging lumbar disc (M51.36 [ICD-10-CM] 722.52 [ICD-9-CM])  Bulging of lumbar intervertebral disc without myelopathy (M51.36 [ICD-10-CM] 722.52 [ICD-9-CM])      Refer to Dept: [facility]  Refer to Provider:   Refer to Provider Phone:   Refer to Facility:             This document serves as a request of services and does not constitute Insurance authorization or approval of services.  To determine eligibility, please contact the members Insurance carrier to verify and review coverage.     If you have medical questions regarding this request for services. Please contact [facility] at [phone] during normal business hours.        Verbal Order Mode: Verbal with readback   Authorizing Provider: Karen Bonilla, PA  Authorizing Provider's NPI: [id number]     Order Entered By: Isabel Lo RN 2023  1:01 PM

## 2023-04-09 NOTE — DISCHARGE PLACEMENT REQUEST
"Cheri Owens (42 y.o. Female)     Date of Birth   1980    Social Security Number       Address   [address] [place] [facility]    Home Phone   [phone]    MRN   [id number]       Mandaen   Zoroastrianism    Marital Status                               Admission Date   23    Admission Type   Emergency    Admitting Provider   Jc Macedo MD    Attending Provider   Jc Macedo MD    Department, Room/Bed   [facility] NEURO HEART, 258/       Discharge Date       Discharge Disposition   Home or Self Care    Discharge Destination                               Attending Provider: Jc Macedo MD    Allergies: Adhesive Tape    Isolation: None   Infection: None   Code Status: CPR    Ht: 165.1 cm (65\")   Wt: 94.6 kg (208 lb 8.9 oz)    Admission Cmt: None   Principal Problem: Left-sided weakness [R53.1]                 Active Insurance as of 2023     Primary Coverage     Payor Plan Insurance Group Employer/Plan Group    [employer] MEDICAID [place] MEDICA[place] [id number]     Payor Plan Address Payor Plan Phone Number Payor Plan Fax Number Effective Dates    [address] [address] [phone]  2020 - None Entered    [facility] [id number]       Subscriber Name Subscriber Birth Date Member ID       CHERI OWENS 1980 [id number]                 Emergency Contacts      (Rel.) Home Phone Work Phone Mobile Phone    VIVIAN MARTIN (Spouse) [phone]               History & Physical      Jc Macedo MD at 23 1909              [facility] Medicine Services      Patient Name: Cheri Owens  : 1980  MRN: [id number]  Primary Care Physician:  Nataly Barr APRN  Date of admission: 2023      Subjective       Chief Complaint: Bilateral lower extremity weakness, headache, dizziness, nausea and unsteady gait.    History of Present Illness: Cheri Owens is a 42 y.o. " female who presented to [facility] on 4/7/2023 complaining of bilateral lower extremity weakness, headache, nausea and unsteady gait including dragging her feet.  Patient is a 42-year-old female with past medical history of trigeminal neuralgia, osteoarthritis of the knees status post left knee replacement and a primary hypertension who presented to the emergency room because of a sudden onset of a neurologic symptoms.  Patient reported that her symptoms started around 0800 on the day of presentation with right-sided weakness and then shifted to left-sided weakness associated with headache, dizziness, gait abnormality including dragging of her feet.  It was also noted that patient had some numbness on the right side before switching to the left.  The  noted that she started having a frontal headache before the headache became diffused.  Patient reported improvement in her symptoms at the time she was seen but still having difficulties with gait.  A code stroke was called in the emergency room and neurology consult was completed.  CT scan of the head and brain and MRI were completed and were negative.  CT angiogram of the head and the neck were completed and did not show any major vessel stenosis, thrombosis or plaques.    Patient reported no fever or chills, no visual obscurations, no hot or cold intolerance, no constipation or diarrhea, no chest pain or abdominal pain and no leg swelling or leg pain.      Review of Systems   Constitutional: Negative.   Eyes: Negative.    Cardiovascular: Negative.    Respiratory: Negative.    Endocrine: Negative.    Hematologic/Lymphatic: Negative.    Skin: Negative.    Musculoskeletal: Negative.    Gastrointestinal: Negative.    Genitourinary: Negative.    Neurological: Positive for dizziness, focal weakness, headaches and loss of balance.   Psychiatric/Behavioral: Negative.    Allergic/Immunologic: Negative.           Personal History     Past medical history:  1.   Primary hypertension.  2.  Trigeminal neuralgia.  3.  Primary osteoarthritis of the knee.      Past surgical history:  1.  Left knee arthroplasty.      Family History:   Father: Unknown.  Mother: Anxiety and depression        Social History:     1.  No tobacco.  2.  Occasional alcohol.  3.  No IV drug use.  4.  Patient lives at home with her family,  and kids.      Home Medications:  Prior to Admission Medications     Prescriptions Last Dose Informant Patient Reported? Taking?    buPROPion XL (WELLBUTRIN XL) 150 MG 24 hr tablet 4/7/2023  Yes Yes    Take 1 tablet by mouth Daily.    lamoTRIgine (LaMICtal) 25 MG tablet 4/7/2023  Yes Yes    Take 1 tablet by mouth 2 (Two) Times a Day.    lisinopril (PRINIVIL,ZESTRIL) 10 MG tablet 4/7/2023  Yes Yes    Take 1 tablet by mouth Daily.    OXcarbazepine (TRILEPTAL) 150 MG tablet 4/7/2023  Yes Yes    Take 3 tablets by mouth 2 (Two) Times a Day.            Allergies:  Allergies   Allergen Reactions   • Adhesive Tape Rash       Objective       Vitals:   Temp:  [98.5 °F (36.9 °C)] 98.5 °F (36.9 °C)  Heart Rate:  [64-69] 68  Resp:  [18] 18  BP: (132-159)/(76-94) 132/84    Physical Exam  Vitals reviewed.   Constitutional:       General: She is not in acute distress.  HENT:      Head: Normocephalic and atraumatic.      Nose: Nose normal. No congestion or rhinorrhea.      Mouth/Throat:      Mouth: Mucous membranes are moist.      Pharynx: Oropharynx is clear. No oropharyngeal exudate or posterior oropharyngeal erythema.   Eyes:      Pupils: Pupils are equal, round, and reactive to light.   Cardiovascular:      Pulses: Normal pulses.      Heart sounds: Normal heart sounds. No murmur heard.    No friction rub. No gallop.      Comments: S1 and S2 present.  No tachycardia.  Pulmonary:      Effort: No respiratory distress.      Breath sounds: No wheezing, rhonchi or rales.   Chest:      Chest wall: No tenderness.   Abdominal:      General: Abdomen is flat. Bowel sounds are normal.  There is no distension.      Palpations: Abdomen is soft.      Tenderness: There is no abdominal tenderness. There is no right CVA tenderness or guarding.   Musculoskeletal:         General: No swelling, tenderness, deformity or signs of injury.      Cervical back: Neck supple. No tenderness.      Right lower leg: No edema.      Left lower leg: No edema.   Skin:     Capillary Refill: Capillary refill takes less than 2 seconds.      Coloration: Skin is not jaundiced.      Findings: No bruising, lesion or rash.   Neurological:      Mental Status: She is alert and oriented to person, place, and time.      Comments: No facial asymmetry noted.  Gait and station not tested.  Strength reduced on both sides left worse than the right.  Strength 3/5 on the left and 4/5 on the right.  Lower extremities not tested.  Patient was alert and oriented.   Psychiatric:         Behavior: Behavior normal.            Result Review    Result Review:  I have personally reviewed the results from the time of this admission to 4/7/2023 19:33 EDT and agree with these findings:  []  Laboratory  []  Microbiology  []  Radiology  []  EKG/Telemetry   []  Cardiology/Vascular   []  Pathology  []  Old records  []  Other:  Most notable findings include:       Assessment & Plan        Active Hospital Problems:  Active Hospital Problems    Diagnosis    • **Left-sided weakness    • Right sided weakness    • Tension type headache    • Trigeminal neuralgia    • Primary hypertension    • Primary osteoarthritis            Plan:        -Continue appropriate patient's home medications for other chronic medical conditions.  -Continue the present level of care.  -Patient and family agreed with the plan of care.  -Follow neurology recommendations.  -Continue stroke protocol.  -Treat hypertension with lisinopril and hydralazine as needed.      DVT prophylaxis:  Mechanical DVT prophylaxis orders are present.    CODE STATUS:    Level Of Support Discussed With:  Patient  Code Status (Patient has no pulse and is not breathing): CPR (Attempt to Resuscitate)  Medical Interventions (Patient has pulse or is breathing): Full Support  Release to patient: Routine Release    Admission Status:  I believe this patient meets observation status.    I discussed the patient's findings and my recommendations with patient, family, nursing staff, primary care team and consulting provider.    This patient has been examined wearing appropriate Personal Protective Equipment and discussed with hospital infection control department, [facility], infectious disease specialist and pulmonologist. 23      Signature:Electronically signed by Jc Macedo MD, FACP, 23, 7:09 PM EDT.    Electronically signed by Jc Macedo MD at 23 1938          Physician Progress Notes (last 24 hours)      Jc Macedo MD at 23 1728              [facility] Medicine Services Daily Progress Note    Patient Name: Lexis Hernandez  : 1980  MRN: [id number]  Primary Care Physician:  Nataly Barr APRN  Date of admission: 2023      Subjective       Chief Complaint: Left lower extremity weakness.      Patient Reports:      2023.  Patient was seen and examined.  Patient reported slight improvement in her symptoms.    Review of Systems   Constitutional: Positive for malaise/fatigue.   HENT: Negative.    Eyes: Negative.    Cardiovascular: Negative.    Respiratory: Negative.    Endocrine: Negative.    Hematologic/Lymphatic: Negative.    Skin: Negative.    Musculoskeletal: Negative.    Gastrointestinal: Negative.    Genitourinary: Negative.    Neurological: Negative.    Psychiatric/Behavioral: Negative.    Allergic/Immunologic: Negative.             Objective       Vitals:   Temp:  [97.4 °F (36.3 °C)-98.8 °F (37.1 °C)] 98.1 °F (36.7 °C)  Heart Rate:  [56-77] 77  Resp:  [13-18] 15  BP: (104-133)/(61-77) 133/68    Physical Exam  Vitals  reviewed.   Constitutional:       General: She is not in acute distress.  HENT:      Head: Normocephalic and atraumatic.      Nose: Nose normal. No congestion or rhinorrhea.      Mouth/Throat:      Mouth: Mucous membranes are moist.      Pharynx: Oropharynx is clear. No oropharyngeal exudate or posterior oropharyngeal erythema.   Eyes:      Pupils: Pupils are equal, round, and reactive to light.   Cardiovascular:      Pulses: Normal pulses.      Heart sounds: Normal heart sounds. No murmur heard.    No friction rub. No gallop.      Comments: S1 and S2 present.  No tachycardia.  Pulmonary:      Effort: No respiratory distress.      Breath sounds: No wheezing, rhonchi or rales.   Chest:      Chest wall: No tenderness.   Abdominal:      General: Abdomen is flat. Bowel sounds are normal. There is no distension.      Palpations: Abdomen is soft.      Tenderness: There is no abdominal tenderness. There is no right CVA tenderness or guarding.   Musculoskeletal:         General: No swelling, tenderness, deformity or signs of injury.      Cervical back: Neck supple. No tenderness.      Right lower leg: No edema.      Left lower leg: No edema.   Skin:     Capillary Refill: Capillary refill takes less than 2 seconds.      Coloration: Skin is not jaundiced.      Findings: No bruising, lesion or rash.   Neurological:      Mental Status: She is alert.      Comments: No facial asymmetry noted.  Gait and station not tested.   Psychiatric:      Comments: No agitation.               Result Review    Result Review:  I have personally reviewed the results from the time of this admission to 4/8/2023 17:33 EDT and agree with these findings:  [x]  Laboratory  [x]  Microbiology  [x]  Radiology  []  EKG/Telemetry   []  Cardiology/Vascular   []  Pathology  []  Old records  []  Other:  Most notable findings include:       Procedure Component Value Units Date/Time    MRI Lumbar Spine Without Contrast [[id number]] Isra as Reviewed   Order  Status: Completed Collected: 04/08/23 1228    Updated: 04/08/23 1243   Narrative:     MRI LUMBAR SPINE WO CONTRAST     Date of Exam: 4/8/2023 11:20 AM EDT     Indication: Ataxia or coordination problem (Ped 0-17y).       Comparison: None available.     Technique:  Routine multiplanar/multisequence sequence images of the lumbar spine were obtained without contrast administration.       Findings:   T12-L1: No disc protrusion or extrusion. No central canal or foraminal stenosis     L-1-L2: Left paracentral/foraminal protrusion. No central nerve root compression. Mild left foraminal stenosis     L2-L3: Mild diffuse disc bulge. No significant central canal or foraminal stenosis. Focal T2 signal in the left extra foraminal annulus compatible with annular fissure. Incidental bilateral nerve root sleeve cysts     L3-L4: Diffuse disc bulge. Bilateral facet arthropathy. Mild central canal stenosis. Mild bilateral foraminal stenosis. Focal T2 signal in the left extraforaminal annulus compatible with annular fissure. Incidental bilateral nerve root sleeve cysts     L4-L5: Mild diffuse disc bulge. Bilateral facet arthropathy resulting in slight L4 anterolisthesis. No significant central canal stenosis. Moderate bilateral foraminal stenosis. Focal T2 signal in the left extraforaminal annulus compatible with annular   fissure. Incidental bilateral nerve root sleeve cysts     L5-S1: Broad-based central/left paracentral/left foraminal disc bulge. Left facet arthropathy. The bulging disc contacts the left S1 nerve root as it exits the thecal sac without obvious compression. No thecal sac compression. Moderate left foraminal   stenosis. Incidental right-sided nerve root sleeve cyst     Conus normal in appearance terminating at L1-L2. No abnormality of the cauda equina. Normal generalized marrow signal. Incidental sacral Tarlov cysts. No paraspinal mass or fluid collection. Aorta normal in caliber    Impression:     Impression:    Multilevel lumbar degenerative disease     Electronically Signed: Manish Ballard    4/8/2023 12:41 PM EDT                Assessment & Plan    From previous notes and with minor updates.    Brief Patient Summary:    Patient is a 42-year-old female with past medical history of obesity, trigeminal neuralgia, osteoarthritis of the knees status post left knee replacement and a primary hypertension who presented to the emergency room because of a sudden onset of a neurologic symptoms.  Patient reported that her symptoms started around 0800 on the day of presentation with right-sided weakness and then shifted to left-sided weakness associated with headache, dizziness, gait abnormality including dragging of her feet.  It was also noted that patient had some numbness on the right side before switching to the left.  The  noted that she started having a frontal headache before the headache became diffused.  Patient reported improvement in her symptoms at the time she was seen but still having difficulties with gait.  A code stroke was called in the emergency room and neurology consult was completed.  CT scan of the head and brain and MRI were completed and were negative.  CT angiogram of the head and the neck were completed and did not show any major vessel stenosis, thrombosis or plaques.           aspirin, 325 mg, Oral, Daily   Or  aspirin, 300 mg, Rectal, Daily  atorvastatin, 80 mg, Oral, Nightly  buPROPion XL, 150 mg, Oral, Daily  lamoTRIgine, 25 mg, Oral, BID  lisinopril, 10 mg, Oral, Daily  OXcarbazepine, 450 mg, Oral, BID  senna-docusate sodium, 2 tablet, Oral, BID  sodium chloride, 10 mL, Intravenous, Q12H  sodium chloride, 10 mL, Intravenous, Q12H       sodium chloride, 100 mL/hr, Last Rate: 100 mL/hr (04/08/23 1048)         Active Hospital Problems:  Active Hospital Problems    Diagnosis    • **Left-sided weakness    • Vertebral bodies impingement syndrome    • Bulging lumbar disc    • Right sided weakness     • Tension type headache    • Trigeminal neuralgia    • Primary hypertension    • Primary osteoarthritis      Plan:-Continue appropriate patient's home medications for the chronic medical conditions.  -Continue the present level of care.  -Patient and family agreed with the plan of care.  -Completed neurosurgery consulted because of a bulging disc in L5-S1 causing possible impingement syndrome.      DVT prophylaxis:  Mechanical DVT prophylaxis orders are present.    CODE STATUS:    Level Of Support Discussed With: Patient  Code Status (Patient has no pulse and is not breathing): CPR (Attempt to Resuscitate)  Medical Interventions (Patient has pulse or is breathing): Full Support  Release to patient: Routine Release      Disposition: This wonderful patient can discharge in the next 24 to 48 hours.    This patient has been examined wearing appropriate Personal Protective Equipment and discussed with hospital infection control department, [facility], infectious disease specialist and pulmonologist. 04/08/23      Electronically signed by Jc Macedo MD, FACP, 04/08/23, 17:33 EDT.      [facility] Hospitalist Team             Electronically signed by Jc Macedo MD at 04/08/23 2664          Physical Therapy Notes (last 48 hours)      Callie Lieberman, PT at 04/08/23 2000  Version 1 of 1       Goal Outcome Evaluation:  Plan of Care Reviewed With: patient        Progress: improving  Outcome Evaluation: Patient 41 yo female admitted to the hospital with the complaint of dizziness and left side weakness. CT head reviewed and normal. CTA did not show any significant large vessel occlusion. MRI brain showed no acute abnormality. Recent Dx includes Left-sided weakness. At baseline, patient resides with spouse in a house and is independent in mobility and ADLs without using AD. As per today's evaluation, patient required SBA for Bed Mob, Patient attempted twice for transfers as her L knee buckled in  first attempt and required Min A and RW for transfers and ambulation of around 16ft. Pt will continue to benefit from skilled PT intervention during stay at [facility] and current recommendation is home with HHPT unless pt continues to decline during stay.    Electronically signed by Callie Lieberman PT at 23     Callie Lieberman, PT at 23  Version 1 of 1         Patient Name: Lexis Hernandez  : 1980    MRN: [id number]                              Today's Date: 2023       Admit Date: 2023    Visit Dx:     ICD-10-CM ICD-9-CM   1. Left-sided weakness  R53.1 728.87     Patient Active Problem List   Diagnosis   • Left-sided weakness   • Right sided weakness   • Tension type headache   • Trigeminal neuralgia   • Primary hypertension   • Primary osteoarthritis   • Vertebral bodies impingement syndrome   • Bulging lumbar disc     Past Medical History:   Diagnosis Date   • Hypertension    • Left bundle branch block (LBBB)    • Trigeminal neuralgia      Past Surgical History:   Procedure Laterality Date   •  SECTION     •  SECTION     • EYE SURGERY Bilateral     for lazy eye   • KNEE ARTHROSCOPY W/ MENISCAL REPAIR Right    • LAPAROSCOPIC TUBAL LIGATION         • VAGINAL BIRTH AFTER  SECTION     • VAGINAL REPAIR  2012    vaginal wall repair      General Information     Row Name 23          Physical Therapy Time and Intention    Document Type evaluation  -PG     Mode of Treatment physical therapy  -PG     Row Name 23          General Information    Patient Profile Reviewed yes  -PG     Prior Level of Function independent:  -PG     Existing Precautions/Restrictions fall  -PG     Barriers to Rehab none identified  -PG     Row Name 23          Living Environment    People in Home spouse;child(jj), dependent  -PG     Row Name 23          Cognition    Orientation Status (Cognition) oriented x 4  -PG      Row Name 04/08/23 1943          Safety Issues, Functional Mobility    Safety Issues Affecting Function (Mobility) safety precaution awareness  -PG     Impairments Affecting Function (Mobility) balance;postural/trunk control;endurance/activity tolerance;strength  -PG           User Key  (r) = Recorded By, (t) = Taken By, (c) = Cosigned By    Initials Name Provider Type    Callie Newby PT Physical Therapist               Mobility     Row Name 04/08/23 1945          Bed Mobility    Bed Mobility bed mobility (all) activities  -PG     All Activities, [place] (Bed Mobility) standby assist  -PG     Assistive Device (Bed Mobility) head of bed elevated  -PG     Row Name 04/08/23 1945          Sit-Stand Transfer    Sit-Stand [place] (Transfers) minimum assist (75% patient effort)  -PG     Assistive Device (Sit-Stand Transfers) walker, front-wheeled  -PG     Row Name 04/08/23 1945          Gait/Stairs (Locomotion)    [place] Level (Gait) minimum assist (75% patient effort)  -PG     Assistive Device (Gait) walker, front-wheeled  -PG     Distance in Feet (Gait) 16FT  -PG     Left Sided Gait Deviations knee buckling, left side  -PG           User Key  (r) = Recorded By, (t) = Taken By, (c) = Cosigned By    Initials Name Provider Type    PG Callie Lieberman PT Physical Therapist               Obj/Interventions     Row Name 04/08/23 1946          Range of Motion Comprehensive    General Range of Motion bilateral upper extremity ROM WFL;bilateral lower extremity ROM WFL  -PG     Row Name 04/08/23 1946          Strength Comprehensive (MMT)    Comment, General Manual Muscle Testing (MMT) Assessment LUE grossly 3+/5, LLE grossly 3+/5, RLE grossly  4/5  -PG     Row Name 04/08/23 1946          Balance    Balance Assessment sitting static balance;sitting dynamic balance;sit to stand dynamic balance;standing dynamic balance  -PG     Static Sitting Balance standby assist  -PG     Dynamic Sitting Balance minimal  assist;contact guard  -PG     Position, Sitting Balance sitting edge of bed;unsupported  -PG     Sit to Stand Dynamic Balance minimal assist  -PG     Dynamic Standing Balance minimal assist  -PG     Position/Device Used, Standing Balance walker, rolling  -PG           User Key  (r) = Recorded By, (t) = Taken By, (c) = Cosigned By    Initials Name Provider Type    PG Callie Lieberman, PT Physical Therapist               Goals/Plan     Row Name 04/08/23 1958          Bed Mobility Goal 1 (PT)    Activity/Assistive Device (Bed Mobility Goal 1, PT) bed mobility activities, all  -PG     [place]/Cues Needed (Bed Mobility Goal 1, PT) independent  -PG     Time Frame (Bed Mobility Goal 1, PT) long term goal (LTG);2 weeks  -PG     Row Name 04/08/23 1958          Transfer Goal 1 (PT)    Activity/Assistive Device (Transfer Goal 1, PT) transfers, all  -PG     [place]/Cues Needed (Transfer Goal 1, PT) modified independence  -PG     Time Frame (Transfer Goal 1, PT) long term goal (LTG);2 weeks  -PG     Row Name 04/08/23 1958          Gait Training Goal 1 (PT)    Activity/Assistive Device (Gait Training Goal 1, PT) gait (walking locomotion)  -PG     [place] (Gait Training Goal 1, PT) modified independence  -PG     Distance (Gait Training Goal 1, PT) 200ft  -PG     Time Frame (Gait Training Goal 1, PT) long term goal (LTG);2 weeks  -PG     Row Name 04/08/23 1958          Problem Specific Goal 1 (PT)    Problem Specific Goal 1 (PT) to improve strength  -PG     Time Frame (Problem Specific Goal 1, PT) long-term goal (LTG);2 weeks  -PG     Row Name 04/08/23 1958          Therapy Assessment/Plan (PT)    Planned Therapy Interventions (PT) balance training;bed mobility training;gait training;home exercise program;neuromuscular re-education;patient/family education;postural re-education;transfer training;strengthening;ROM (range of motion)  -PG           User Key  (r) = Recorded By, (t) = Taken By, (c) =  Cosigned By    Initials Name Provider Type    PG Callie Lieberman, PT Physical Therapist               Clinical Impression     Row Name 04/08/23 1950          Pain    Pretreatment Pain Rating 0/10 - no pain  -PG     Posttreatment Pain Rating 0/10 - no pain  -PG     Row Name 04/08/23 1950          Plan of Care Review    Plan of Care Reviewed With patient  -PG     Progress improving  -PG     Outcome Evaluation Patient 43 yo female admitted to the hospital with the complaint of dizziness and left side weakness. CT head reviewed and normal. CTA did not show any significant large vessel occlusion. MRI brain showed no acute abnormality. Recent Dx includes Left-sided weakness. At baseline, patient resides with spouse in a house and is independent in mobility and ADLs without using AD. As per today's evaluation, patient required SBA for Bed Mob, Patient attempted twice for transfers as her L knee buckled in first attempt and required Min A and RW for transfers and ambulation of around 16ft. Pt will continue to benefit from skilled PT intervention during stay at [facility] and current recommendation is home with HHPT unless pt continues to decline during stay.  -PG     Row Name 04/08/23 1950          Therapy Assessment/Plan (PT)    Patient/Family Therapy Goals Statement (PT) to return to [facility]  -PG     Rehab Potential (PT) good, to achieve stated therapy goals  -PG     Criteria for Skilled Interventions Met (PT) yes;skilled treatment is necessary  -PG     Therapy Frequency (PT) 5 times/wk  -PG     Predicted Duration of Therapy Intervention (PT) until d/c  -PG     Row Name 04/08/23 1950          Vital Signs    Pre Systolic BP Rehab 129  -PG     Pre Treatment Diastolic BP 76  -PG     Post Systolic BP Rehab 133  -PG     Post Treatment Diastolic BP 68  -PG     Pretreatment Heart Rate (beats/min) 69  -PG     Posttreatment Heart Rate (beats/min) 71  -PG     Pretreatment Resp Rate (breaths/min) 15  -PG     Posttreatment Resp Rate  (breaths/min) 14  -PG     Pre SpO2 (%) 100  -PG     O2 Delivery Pre Treatment room air  -PG     Post SpO2 (%) 100  -PG     O2 Delivery Post Treatment room air  -PG     Row Name 04/08/23 1950          Positioning and Restraints    Pre-Treatment Position in bed  -PG     Post Treatment Position bed  -PG     In Bed notified nsg;call light within reach;encouraged to call for assist;exit alarm on  -PG           User Key  (r) = Recorded By, (t) = Taken By, (c) = Cosigned By    Initials Name Provider Type    Callie Newby PT Physical Therapist               Outcome Measures     Row Name 04/08/23 1959          How much help from another person do you currently need...    Turning from your back to your side while in flat bed without using bedrails? 4  -PG     Moving from lying on back to sitting on the side of a flat bed without bedrails? 3  -PG     Moving to and from a bed to a chair (including a wheelchair)? 3  -PG     Standing up from a chair using your arms (e.g., wheelchair, bedside chair)? 3  -PG     Climbing 3-5 steps with a railing? 2  -PG     To walk in hospital room? 3  -PG     AM-PAC 6 Clicks Score (PT) 18  -PG     Highest level of mobility 6 --> Walked 10 steps or more  -PG     Row Name 04/08/23 1959          Modified Normanna Scale    Modified Abbey Scale 4 - Moderately severe disability.  Unable to walk without assistance, and unable to attend to own bodily needs without assistance.  -PG     Row Name 04/08/23 1959          Functional Assessment    Outcome Measure Options AM-PAC 6 Clicks Basic Mobility (PT);Modified Abeby  -PG           User Key  (r) = Recorded By, (t) = Taken By, (c) = Cosigned By    Initials Name Provider Type    Callie Newby PT Physical Therapist                             Physical Therapy Education     Title: PT OT SLP Therapies (Done)     Topic: Physical Therapy (Done)     Point: Mobility training (Done)     Learning Progress Summary           Patient Acceptance, E, VU by PG at  4/8/2023 2000                   Point: Home exercise program (Done)     Learning Progress Summary           Patient Acceptance, E, VU by PG at 4/8/2023 2000                   Point: Body mechanics (Done)     Learning Progress Summary           Patient Acceptance, E, VU by PG at 4/8/2023 2000                   Point: Precautions (Done)     Learning Progress Summary           Patient Acceptance, E, VU by PG at 4/8/2023 2000                               User Key     Initials Effective Dates Name Provider Type Discipline    PG 09/01/22 -  Callie Lieberman, PT Physical Therapist PT              PT Recommendation and Plan  Planned Therapy Interventions (PT): balance training, bed mobility training, gait training, home exercise program, neuromuscular re-education, patient/family education, postural re-education, transfer training, strengthening, ROM (range of motion)  Plan of Care Reviewed With: patient  Progress: improving  Outcome Evaluation: Patient 43 yo female admitted to the hospital with the complaint of dizziness and left side weakness. CT head reviewed and normal. CTA did not show any significant large vessel occlusion. MRI brain showed no acute abnormality. Recent Dx includes Left-sided weakness. At baseline, patient resides with spouse in a house and is independent in mobility and ADLs without using AD. As per today's evaluation, patient required SBA for Bed Mob, Patient attempted twice for transfers as her L knee buckled in first attempt and required Min A and RW for transfers and ambulation of around 16ft. Pt will continue to benefit from skilled PT intervention during stay at [facility] and current recommendation is home with HHPT unless pt continues to decline during stay.     Time Calculation:    PT Charges     Row Name 04/08/23 2001             Time Calculation    Start Time 1323  -PG      Stop Time 1350  -PG      Time Calculation (min) 27 min  -PG      PT Received On 04/08/23  -PG      PT - Next Appointment  04/10/23  -PG      PT Goal Re-Cert Due Date 23  -PG         Time Calculation- PT    Total Timed Code Minutes- PT 0 minute(s)  -PG            User Key  (r) = Recorded By, (t) = Taken By, (c) = Cosigned By    Initials Name Provider Type    PG Callie Lieberman, PT Physical Therapist              Therapy Charges for Today     Code Description Service Date Service Provider Modifiers Qty    31381171292 HC PT EVAL MOD COMPLEXITY 4 2023 Callie Lieberman, DEBORAH GP 1          PT G-Codes  Outcome Measure Options: AM-PAC 6 Clicks Basic Mobility (PT), Modified Bates  AM-PAC 6 Clicks Score (PT): 18  Modified Bates Scale: 4 - Moderately severe disability.  Unable to walk without assistance, and unable to attend to own bodily needs without assistance.  PT Discharge Summary  Anticipated Discharge Disposition (PT): home with home health    Callie Lieberman PT  2023      Electronically signed by Callie Lieberman PT at [address] [place] [address] [facility]  [address] [facility] [place] [address]  Phone:  [phone]  Fax:  [phone] Date: 2023      Ambulatory Referral to Physical Therapy Evaluate and treat     Patient:  Lexis Hernandez MRN:  [id number]   [address] [place] [address] [place] [address] :  1980  SSN:    Phone: [phone] Sex:  F      INSURANCE PAYOR PLAN GROUP # SUBSCRIBER ID   Primary:    [facility] MEDICAID [id number] [facility] [id number]      Referring Provider Information:  KAREN BONILLA Phone: [phone] Fax: [phone]       Referral Information:   # Visits:  1 Referral Type: Physical Therapy [AE1]   Urgency:  Routine Referral Reason: Specialty Services Required   Start Date: 2023 End Date:  To be determined by Insurer   Diagnosis: Left-sided weakness (R53.1 [ICD-10-CM] 728.87 [ICD-9-CM])  Bulging lumbar disc (M51.36 [ICD-10-CM] 722.52 [ICD-9-CM])  Bulging of lumbar intervertebral disc without myelopathy (M51.36 [ICD-10-CM] 722.52 [ICD-9-CM])       Refer to Dept:   Refer to Provider:   Refer to Provider Phone:   Refer to Facility:       Specialty needed: Evaluate and treat  Follow-up needed: Yes     This document serves as a request of services and does not constitute Insurance authorization or approval of services.  To determine eligibility, please contact the members Insurance carrier to verify and review coverage.     If you have medical questions regarding this request for services. Please contact [facility] at [phone] during normal business hours.        Verbal Order Mode: Verbal with readback   Authorizing Provider: Travis Carvalho PA  Authorizing Provider's NPI: [id number]     Order Entered By: Isabel Lo RN 4/9/2023  1:09 PM

## 2023-04-09 NOTE — PLAN OF CARE
Goal Outcome Evaluation:  Plan of Care Reviewed With: patient        Progress: improving     Patient discharging home today, neurosurgery wants patient to have outpatient PT and pain management and follow up with them at clinic, questions and concerns answered, family to transport home.      Problem: Adult Inpatient Plan of Care  Goal: Plan of Care Review  Outcome: Ongoing, Progressing  Flowsheets (Taken 4/9/2023 1308)  Progress: improving  Plan of Care Reviewed With: patient  Goal: Patient-Specific Goal (Individualized)  Outcome: Ongoing, Progressing  Goal: Absence of Hospital-Acquired Illness or Injury  Outcome: Ongoing, Progressing  Intervention: Identify and Manage Fall Risk  Recent Flowsheet Documentation  Taken 4/9/2023 1200 by Krissy Partida RN  Safety Promotion/Fall Prevention: safety round/check completed  Taken 4/9/2023 1016 by Krissy Partida RN  Safety Promotion/Fall Prevention: safety round/check completed  Taken 4/9/2023 0800 by Krissy Partida RN  Safety Promotion/Fall Prevention:   activity supervised   assistive device/personal items within reach   clutter free environment maintained   fall prevention program maintained   mobility aid in reach   nonskid shoes/slippers when out of bed   safety round/check completed  Intervention: Prevent Infection  Recent Flowsheet Documentation  Taken 4/9/2023 1200 by Krissy Partida RN  Infection Prevention: personal protective equipment utilized  Taken 4/9/2023 1016 by Krissy Partida RN  Infection Prevention: personal protective equipment utilized  Taken 4/9/2023 0800 by Krissy Partida RN  Infection Prevention: personal protective equipment utilized  Goal: Optimal Comfort and Wellbeing  Outcome: Ongoing, Progressing  Intervention: Provide Person-Centered Care  Recent Flowsheet Documentation  Taken 4/9/2023 0800 by Krissy Partida RN  Trust Relationship/Rapport:   care explained   questions answered   questions encouraged   reassurance provided  Goal: Readiness  for Transition of Care  Outcome: Ongoing, Progressing     Problem: Adjustment to Illness (Stroke, Ischemic/Transient Ischemic Attack)  Goal: Optimal Coping  Outcome: Ongoing, Progressing     Problem: Bowel Elimination Impaired (Stroke, Ischemic/Transient Ischemic Attack)  Goal: Effective Bowel Elimination  Outcome: Ongoing, Progressing     Problem: Cerebral Tissue Perfusion (Stroke, Ischemic/Transient Ischemic Attack)  Goal: Optimal Cerebral Tissue Perfusion  Outcome: Ongoing, Progressing     Problem: Cognitive Impairment (Stroke, Ischemic/Transient Ischemic Attack)  Goal: Optimal Cognitive Function  Outcome: Ongoing, Progressing     Problem: Communication Impairment (Stroke, Ischemic/Transient Ischemic Attack)  Goal: Improved Communication Skills  Outcome: Ongoing, Progressing     Problem: Functional Ability Impaired (Stroke, Ischemic/Transient Ischemic Attack)  Goal: Optimal Functional Ability  Outcome: Ongoing, Progressing     Problem: Respiratory Compromise (Stroke, Ischemic/Transient Ischemic Attack)  Goal: Effective Oxygenation and Ventilation  Outcome: Ongoing, Progressing     Problem: Sensorimotor Impairment (Stroke, Ischemic/Transient Ischemic Attack)  Goal: Improved Sensorimotor Function  Outcome: Ongoing, Progressing     Problem: Swallowing Impairment (Stroke, Ischemic/Transient Ischemic Attack)  Goal: Optimal Eating and Swallowing without Aspiration  Outcome: Ongoing, Progressing     Problem: Urinary Elimination Impaired (Stroke, Ischemic/Transient Ischemic Attack)  Goal: Effective Urinary Elimination  Outcome: Ongoing, Progressing     Problem: Fall Injury Risk  Goal: Absence of Fall and Fall-Related Injury  Outcome: Ongoing, Progressing  Intervention: Identify and Manage Contributors  Recent Flowsheet Documentation  Taken 4/9/2023 0800 by Krissy Partida, RN  Medication Review/Management: medications reviewed  Intervention: Promote Injury-Free Environment  Recent Flowsheet Documentation  Taken 4/9/2023  1200 by Krissy Partida, RN  Safety Promotion/Fall Prevention: safety round/check completed  Taken 4/9/2023 1016 by Krissy Partida, RN  Safety Promotion/Fall Prevention: safety round/check completed  Taken 4/9/2023 0800 by Krissy Partida, RN  Safety Promotion/Fall Prevention:   activity supervised   assistive device/personal items within reach   clutter free environment maintained   fall prevention program maintained   mobility aid in reach   nonskid shoes/slippers when out of bed   safety round/check completed

## 2023-04-09 NOTE — CONSULTS
Neurosurgery Consultation      Patient: Lexis Hernandez    Sex: female    YOB: 1980    Date of Admission: 4/7/2023  1:06 PM    Admitting Dx: Left-sided weakness [R53.1]    Reason for Consult: Left leg weakness      Subjective     CC: Left-sided LBP with left leg weakness since Friday    HPI:  Patient is a 42 y.o. female with HTN and trigeminal neuralgia currently in the hospital due to acute left-sided weakness.  Neurosurgery was consulted due to MRI findings concerning for herniated disc.    During my evaluation patient reports she woke up 2 days ago (Friday) with left-sided weakness, particularly in her leg.  She denies injury or inciting event prior to the onset of symptoms.  This was severe at first but has somewhat improved today.  She now describes left-sided low back pain that radiates down the back of her left leg and into her foot.  Pain is associated with numbness and tingling.  She denies bowel/bladder dysfunction and saddle anesthesia.  She is asymptomatic on the right side.  She reports she has previously received epidural steroid injections at L3-4 several years ago.      PMH:  Past Medical History:   Diagnosis Date   • Hypertension    • Left bundle branch block (LBBB)    • Trigeminal neuralgia          Current Facility-Administered Medications:   •  acetaminophen (TYLENOL) tablet 650 mg, 650 mg, Oral, Q4H PRN, 650 mg at 04/08/23 2056 **OR** acetaminophen (TYLENOL) 160 MG/5ML solution 650 mg, 650 mg, Oral, Q4H PRN **OR** acetaminophen (TYLENOL) suppository 650 mg, 650 mg, Rectal, Q4H PRN, Jc Macedo MD  •  aluminum-magnesium hydroxide-simethicone (MAALOX MAX) 400-400-40 MG/5ML suspension 15 mL, 15 mL, Oral, Q6H PRN, Jc Macedo MD  •  aspirin tablet 325 mg, 325 mg, Oral, Daily, 325 mg at 04/09/23 0823 **OR** aspirin suppository 300 mg, 300 mg, Rectal, Daily, Jc Macedo MD  •  atorvastatin (LIPITOR) tablet 80 mg, 80 mg, Oral, Nightly, Jc Macedo MD, 80 mg  at 04/08/23 2056  •  sennosides-docusate (PERICOLACE) 8.6-50 MG per tablet 2 tablet, 2 tablet, Oral, BID, 2 tablet at 04/09/23 0823 **AND** polyethylene glycol (MIRALAX) packet 17 g, 17 g, Oral, Daily PRN **AND** bisacodyl (DULCOLAX) EC tablet 5 mg, 5 mg, Oral, Daily PRN **AND** bisacodyl (DULCOLAX) suppository 10 mg, 10 mg, Rectal, Daily PRN, Jc Macedo MD  •  buPROPion XL (WELLBUTRIN XL) 24 hr tablet 150 mg, 150 mg, Oral, Daily, Jc Macedo MD, 150 mg at 04/09/23 0823  •  calcium carbonate (TUMS) chewable tablet 500 mg (200 mg elemental), 2 tablet, Oral, TID PRN, Ana Chiu, APRN, 2 tablet at 04/09/23 0521  •  lamoTRIgine (LaMICtal) tablet 25 mg, 25 mg, Oral, BID, Jc Macedo MD, 25 mg at 04/09/23 0823  •  lisinopril (PRINIVIL,ZESTRIL) tablet 10 mg, 10 mg, Oral, Daily, Jc Macedo MD, 10 mg at 04/09/23 0822  •  nitroglycerin (NITROSTAT) SL tablet 0.4 mg, 0.4 mg, Sublingual, Q5 Min PRN, Jc Macedo MD  •  ondansetron (ZOFRAN) injection 4 mg, 4 mg, Intravenous, Q6H PRN, Jc Macedo MD, 4 mg at 04/07/23 1807  •  OXcarbazepine (TRILEPTAL) tablet 450 mg, 450 mg, Oral, BID, Jc Macedo MD, 450 mg at 04/09/23 0823  •  oxyCODONE (ROXICODONE) immediate release tablet 10 mg, 10 mg, Oral, Q4H PRN, Jc Macedo MD  •  oxyCODONE (ROXICODONE) immediate release tablet 5 mg, 5 mg, Oral, Q4H PRN, Jc Macedo MD, 5 mg at 04/09/23 0407  •  sodium chloride 0.9 % flush 10 mL, 10 mL, Intravenous, PRN, Jc Macedo MD  •  sodium chloride 0.9 % flush 10 mL, 10 mL, Intravenous, Q12H, Jc Macedo MD, 10 mL at 04/09/23 0826  •  sodium chloride 0.9 % flush 10 mL, 10 mL, Intravenous, PRN, Jc Macedo MD  •  sodium chloride 0.9 % flush 10 mL, 10 mL, Intravenous, Q12H, Jc Macedo MD, 10 mL at 04/09/23 0825  •  sodium chloride 0.9 % flush 10 mL, 10 mL, Intravenous, Remington VANESSA Charles O, MD  •  sodium chloride 0.9 % infusion 40 mL, 40 mL, Intravenous, PRNRemington,  Jc MURRAY MD  •  sodium chloride 0.9 % infusion 40 mL, 40 mL, Intravenous, PRN, Jc Macedo MD  •  sodium chloride 0.9 % infusion, 100 mL/hr, Intravenous, Continuous, Jc Macedo MD, Held at 23    Allergies   Allergen Reactions   • Adhesive Tape Rash       Past Surgical History:   Procedure Laterality Date   •  SECTION     •  SECTION     • EYE SURGERY Bilateral     for lazy eye   • KNEE ARTHROSCOPY W/ MENISCAL REPAIR Right    • LAPAROSCOPIC TUBAL LIGATION         • VAGINAL BIRTH AFTER  SECTION     • VAGINAL REPAIR  2012    vaginal wall repair       Social History     Socioeconomic History   • Marital status:    Tobacco Use   • Smoking status: Never   • Smokeless tobacco: Never   Vaping Use   • Vaping Use: Never used   Substance and Sexual Activity   • Alcohol use: Yes     Alcohol/week: 1.0 standard drink     Types: 1 Glasses of wine per week   • Drug use: Never   • Sexual activity: Yes     Partners: Male       Family History   Problem Relation Age of Onset   • COPD Maternal Grandmother    • Heart failure Maternal Grandmother    • Cancer Maternal Grandfather          Current Medications:  Scheduled Meds:aspirin, 325 mg, Oral, Daily   Or  aspirin, 300 mg, Rectal, Daily  atorvastatin, 80 mg, Oral, Nightly  buPROPion XL, 150 mg, Oral, Daily  lamoTRIgine, 25 mg, Oral, BID  lisinopril, 10 mg, Oral, Daily  OXcarbazepine, 450 mg, Oral, BID  senna-docusate sodium, 2 tablet, Oral, BID  sodium chloride, 10 mL, Intravenous, Q12H  sodium chloride, 10 mL, Intravenous, Q12H      Continuous Infusions:sodium chloride, 100 mL/hr, Last Rate: Stopped (23)      PRN Meds: •  acetaminophen **OR** acetaminophen **OR** acetaminophen  •  aluminum-magnesium hydroxide-simethicone  •  senna-docusate sodium **AND** polyethylene glycol **AND** bisacodyl **AND** bisacodyl  •  calcium carbonate  •  nitroglycerin  •  ondansetron  •  oxyCODONE  •  oxyCODONE  •   sodium chloride  •  sodium chloride  •  sodium chloride  •  sodium chloride  •  sodium chloride    ROS:  14 point review of systems was completed and is negative except for what is noted in HPI      Objective     Vitals:    04/08/23 2129 04/09/23 0158 04/09/23 0621 04/09/23 0820   BP:  120/76 117/79 137/87   BP Location:  Left arm Left arm    Patient Position:  Lying Sitting    Pulse:   72 68   Resp: 21 15 14    Temp: 98.1 °F (36.7 °C) 97.8 °F (36.6 °C) 97.7 °F (36.5 °C)    TempSrc: Oral Oral Oral    SpO2:   97% 100%   Weight:  94.6 kg (208 lb 8.9 oz)     Height:           Physical exam    General  - WD/WN female, appears their stated age, awake, cooperative, in no acute distress  HEENT  - Normocephalic, atraumatic, PERRLA, EOM intact  Cardiac  - RRR, no peripheral edema  Respiratory  - Normal respiratory rate and effort  Abdomen  - Soft, NT/ND  Musculoskeletal  - No joint redness, swelling, or tenderness  Skin  - Warm and dry, no bleeding, bruising, or rash  NEUROLOGIC  - A/O x3  - 4+/5 left plantarflexion  - 5-/5 bilateral hip flexion, likely secondary pain  - Otherwise full strength and sensation        RESULTS REVIEW:  Results from last 7 days   Lab Units 04/09/23 0347 04/08/23  1443 04/07/23  1329   WBC 10*3/mm3 8.50 7.40 6.10   HEMOGLOBIN g/dL 13.2 13.4 15.4   HEMATOCRIT % 38.9 39.4 48.0*   PLATELETS 10*3/mm3 283 318 372        Results from last 7 days   Lab Units 04/09/23  0347 04/08/23  1443 04/07/23  1329   SODIUM mmol/L 138 140 138   POTASSIUM mmol/L 4.0 4.2 3.9   CHLORIDE mmol/L 103 103 99   CO2 mmol/L 29.0 28.0 28.0   BUN mg/dL 8 8 6   CREATININE mg/dL 0.63 0.70 0.68   CALCIUM mg/dL 8.6 9.1 9.6   BILIRUBIN mg/dL  --   --  0.2   ALK PHOS U/L  --   --  84   ALT (SGPT) U/L  --   --  15   AST (SGOT) U/L  --   --  18   GLUCOSE mg/dL 113* 102* 87              MRI Lumbar Spine Without Contrast    Result Date: 4/8/2023  MRI LUMBAR SPINE WO CONTRAST Date of Exam: 4/8/2023 11:20 AM EDT Indication: Ataxia or  coordination problem (Ped 0-17y).  Comparison: None available. Technique:  Routine multiplanar/multisequence sequence images of the lumbar spine were obtained without contrast administration.  Findings: T12-L1: No disc protrusion or extrusion. No central canal or foraminal stenosis L-1-L2: Left paracentral/foraminal protrusion. No central nerve root compression. Mild left foraminal stenosis L2-L3: Mild diffuse disc bulge. No significant central canal or foraminal stenosis. Focal T2 signal in the left extra foraminal annulus compatible with annular fissure. Incidental bilateral nerve root sleeve cysts L3-L4: Diffuse disc bulge. Bilateral facet arthropathy. Mild central canal stenosis. Mild bilateral foraminal stenosis. Focal T2 signal in the left extraforaminal annulus compatible with annular fissure. Incidental bilateral nerve root sleeve cysts L4-L5: Mild diffuse disc bulge. Bilateral facet arthropathy resulting in slight L4 anterolisthesis. No significant central canal stenosis. Moderate bilateral foraminal stenosis. Focal T2 signal in the left extraforaminal annulus compatible with annular fissure. Incidental bilateral nerve root sleeve cysts L5-S1: Broad-based central/left paracentral/left foraminal disc bulge. Left facet arthropathy. The bulging disc contacts the left S1 nerve root as it exits the thecal sac without obvious compression. No thecal sac compression. Moderate left foraminal stenosis. Incidental right-sided nerve root sleeve cyst Conus normal in appearance terminating at L1-L2. No abnormality of the cauda equina. Normal generalized marrow signal. Incidental sacral Tarlov cysts. No paraspinal mass or fluid collection. Aorta normal in caliber     Impression: Impression: Multilevel lumbar degenerative disease Electronically Signed: Manish Ballard  4/8/2023 12:41 PM EDT  Workstation ID: [id number]    CT Head Without Contrast Stroke Protocol    Result Date: 4/7/2023  CT HEAD WO CONTRAST STROKE PROTOCOL Date  of Exam: 4/7/2023 1:35 PM EDT Indication: Neuro deficit, acute stroke suspected Neuro deficit, acute, stroke suspected. Comparison: 8/9/2015 Technique: Axial CT images were obtained of the head without contrast administration.  Reconstructed coronal and sagittal images were also obtained. Automated exposure control and iterative construction methods were used. Scan Time:  1341 Results discussed with the stroke team at the CT scanner at 1345. Findings: The ventricles and CSF containing spaces are normal in size and configuration. No intra or extra-axial mass lesions, fluid collections, or mass effect are seen. No focal areas of low attenuation or acute intracranial hemorrhage. The mastoid air cells and  paranasal sinuses are clear. The bony calvarium is intact.     Impression: Impression: Normal noncontrast CT of the brain. Electronically Signed: Rafael LIZZETH Jessie  4/7/2023 1:47 PM EDT  Workstation ID: [id number]    MRI Brain Without Contrast    Result Date: 4/7/2023  MRI BRAIN WO CONTRAST Date of Exam: 4/7/2023 2:19 PM EDT Indication: Dizziness, non-specific.  Comparison: None available. Technique:  Routine multiplanar/multisequence sequence images of the brain were obtained without contrast administration. Findings: The brain parenchyma is normal in volume and signal intensity on the obtained sequences. Midline structures appear unremarkable. No mass lesion, intracranial hemorrhage, or hydrocephalus is identified. Diffusion-weighted sequences demonstrate no acute infarct. Limited visualization of the intracerebral vessels appears unremarkable. The paranasal sinuses and mastoid air cells show no fluid signal. The orbits, globes, retrobulbar soft tissues appear unremarkable. The visualized superficial soft tissues and cervical spine are without significant abnormality.     Impression: Impression: No acute abnormality is identified within the brain. Specifically, no diffusion restriction is identified to suggest acute  infarct. Electronically Signed: Luc Richter  4/7/2023 2:59 PM EDT  Workstation ID: [id number]          Assessment     MDM: This is a 42 y.o. female being evaluated by neurosurgery for acute left-sided low back pain and left leg weakness.  Imaging not consistent with stroke.  She does have a L5-S1 disc herniation with severe left neuroforaminal and lateral recess stenosis.  Her symptoms are consistent with acute lumbar radiculopathy.  She has no red flags for cauda equina syndrome and no concerning emergent neurologic deficits.  She does not require emergent neurosurgical intervention at this time.  I recommend she be discharged with a referral to outpatient physical therapy as well as to pain management for a left L5 transforaminal epidural steroid injection.  When she is completed these therapies she may follow-up in our outpatient neurosurgery clinic to discuss further treatment options.  Patient is agreeable to this plan.        Plan     1. Lumbar radiculopathy, acute  2. Herniated disc at L5-S1  3. Degenerative disc disease, lumbosacral spine  4. Lumbar stenosis without neurogenic claudication  - No emergent neurosurgical intervention necessary at this time  - Outpatient referral to physical therapy upon discharge  - Outpatient referral to pain management for left L5 transforaminal injection  - Follow-up with me in outpatient neurosurgery clinic after completing PT and getting injections    Medical management per primary team.  Neurosurgery will sign off at this time.  Call with any questions or concerns.    I discussed my assessment and recommendations with Dr. Jessica Carvalho PA-C  4/9/2023  09:05 EDT      Part of this note may be an electronic transcription/translation of spoken language to printed text using the Dragon Dictation System.

## 2023-04-09 NOTE — DISCHARGE SUMMARY
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  Date of Discharge: 2023.  Primary Care Physician: Nataly Barr APRN      Presenting Problem:   Left-sided weakness [R53.1]    Active and Resolved Hospital Problems:  Active Hospital Problems    Diagnosis POA   • **Left-sided weakness [R53.1] Yes     Priority: High   • Acute lumbar radiculopathy [M54.16] Yes     Priority: High   • Vertebral bodies impingement syndrome [M54.10] Yes     Priority: High   • Bulging lumbar disc [M51.36] Yes     Priority: High   • Right sided weakness [R53.1] Yes     Priority: High   • Tension type headache [G44.209] Yes     Priority: High   • Trigeminal neuralgia [G50.0] Yes   • Primary hypertension [I10] Yes   • Primary osteoarthritis [M19.91] Yes      Resolved Hospital Problems    Diagnosis POA   • Bulging of lumbar intervertebral disc without myelopathy [M51.36] Yes     Priority: High         Hospital Course   From previous notes and with minor updates.    Hospital Course:      Patient is a 42-year-old female with past medical history of obesity, trigeminal neuralgia, osteoarthritis of the knees status post left knee replacement and a primary hypertension who presented to the emergency room because of a sudden onset of a neurologic symptoms.  Patient reported that her symptoms started around 0800 on the day of presentation with right-sided weakness and then shifted to left-sided weakness associated with headache, dizziness, gait abnormality including dragging of her feet.  It was also noted that patient had some numbness on the right side before switching to the left.  The  noted that she started  having a frontal headache before the headache became diffused.  Patient reported improvement in her symptoms at the time she was seen but still having difficulties with gait.  A code stroke was called in the emergency room and neurology consult was completed.  CT scan of the head and brain and MRI were completed and were negative.  CT angiogram of the head and the neck were completed and did not show any major vessel stenosis, thrombosis or plaques.  Left-sided weakness was treated with physical therapy.  Neurology consult was completed.  Neurosurgery consult was completed.  Low back pain was treated with pain control.  Appropriate patient's home medications were resumed in the hospital for the chronic medical conditions.  Patient reported significant improvement in his symptoms after over 24 hours in the hospital and requested to be discharged home.  Patient was advised to take her medications as prescribed.  The discharge medications are as per medication reconciliation list.  Patient was advised to follow-up with her primary care physician within 3 to 5 days of discharge.  Patient was advised to follow-up with her neurosurgeon within 14 days of discharge.  Patient was advised to follow-up with her neurologist within 14 days of discharge.  Patient was advised to return to the emergency department if she experiences any recurrence of her symptoms.  Patient and family agreed with the plan and patient was discharged in a stable condition.      DISCHARGE Follow Up Recommendations for labs and diagnostics:     Neurosurgery saw patient, no surgical intervention, will sign off                                                                                                       - No emergent neurosurgical intervention necessary at this time  - Outpatient referral to physical therapy upon discharge  - Outpatient referral to pain management for left L5 transforaminal injection  - Follow-up with me in outpatient neurosurgery  clinic after completing PT and getting injections.    Patient was advised to follow-up with her primary care physician who will review her current medications.    Patient was advised to follow-up with her neurologist who will reassess her neuro function.    Patient was advised to follow-up with her neurosurgeon who will reassess DJD of the lumbosacral spine.      Reasons For Change In Medications and Indications for New Medications:      Day of Discharge     Vital Signs:  Temp:  [97.7 °F (36.5 °C)-98.8 °F (37.1 °C)] 98.8 °F (37.1 °C)  Heart Rate:  [66-77] 66  Resp:  [14-21] 21  BP: (108-137)/(68-87) 108/72    Physical Exam:  Physical Exam  Vitals reviewed.   Constitutional:       General: She is not in acute distress.  HENT:      Head: Normocephalic and atraumatic.      Nose: Nose normal. No congestion or rhinorrhea.      Mouth/Throat:      Mouth: Mucous membranes are moist.      Pharynx: Oropharynx is clear. No oropharyngeal exudate or posterior oropharyngeal erythema.   Eyes:      Pupils: Pupils are equal, round, and reactive to light.   Cardiovascular:      Pulses: Normal pulses.      Heart sounds: Normal heart sounds. No murmur heard.    No friction rub. No gallop.      Comments: S1 and S2 present.  No tachycardia.  Pulmonary:      Effort: No respiratory distress.      Breath sounds: No wheezing, rhonchi or rales.   Chest:      Chest wall: No tenderness.   Abdominal:      General: Abdomen is flat. Bowel sounds are normal. There is no distension.      Palpations: Abdomen is soft.      Tenderness: There is no abdominal tenderness. There is no right CVA tenderness or guarding.   Musculoskeletal:         General: No swelling, tenderness, deformity or signs of injury.      Cervical back: Neck supple. No tenderness.      Right lower leg: No edema.      Left lower leg: No edema.   Skin:     Capillary Refill: Capillary refill takes less than 2 seconds.      Coloration: Skin is not jaundiced.      Findings: No bruising,  lesion or rash.   Neurological:      Mental Status: She is alert.      Comments: No facial asymmetry noted.  Gait and station not tested.   Psychiatric:      Comments: No agitation.              Pertinent  and/or Most Recent Results     LAB RESULTS:      Lab 04/09/23  0347 04/08/23  1443 04/07/23  1451 04/07/23  1329   WBC 8.50 7.40  --  6.10   HEMOGLOBIN 13.2 13.4  --  15.4   HEMATOCRIT 38.9 39.4  --  48.0*   PLATELETS 283 318  --  372   NEUTROS ABS 5.40 5.00  --  3.70   LYMPHS ABS 2.00 1.60  --  1.60   MONOS ABS 0.80 0.50  --  0.50   EOS ABS 0.30 0.20  --  0.20   MCV 92.2 91.8  --  94.0   PROTIME  --   --  9.8  --    APTT  --   --  29.9  --          Lab 04/09/23  0347 04/08/23  1443 04/07/23  1329   SODIUM 138 140 138   POTASSIUM 4.0 4.2 3.9   CHLORIDE 103 103 99   CO2 29.0 28.0 28.0   ANION GAP 6.0 9.0 11.0   BUN 8 8 6   CREATININE 0.63 0.70 0.68   EGFR 113.0 110.2 111.0   GLUCOSE 113* 102* 87   CALCIUM 8.6 9.1 9.6   MAGNESIUM 1.8 1.8  --    HEMOGLOBIN A1C  --  5.10  --    TSH  --  1.130  --          Lab 04/07/23  1329   TOTAL PROTEIN 8.1   ALBUMIN 4.7   GLOBULIN 3.4   ALT (SGPT) 15   AST (SGOT) 18   BILIRUBIN 0.2   ALK PHOS 84         Lab 04/07/23  1451 04/07/23  1329   HSTROP T  --  <6   PROTIME 9.8  --    INR 0.95  --          Lab 04/08/23  1443   CHOLESTEROL 128   LDL CHOL 62   HDL CHOL 50   TRIGLYCERIDES 83         Lab 04/07/23  1451   ABO TYPING O   RH TYPING Positive   ANTIBODY SCREEN Negative         Brief Urine Lab Results  (Last result in the past 365 days)      Color   Clarity   Blood   Leuk Est   Nitrite   Protein   CREAT   Urine HCG        04/08/23 0435 Yellow   Clear   Negative   Negative   Negative   Negative               Microbiology Results (last 10 days)     ** No results found for the last 240 hours. **          CT Angiogram Neck    Result Date: 4/7/2023  Impression: Impression: Major arterial vasculature within head and neck appears widely patent, with no hemodynamically significant stenosis,  dissection, thrombus, or aneurysm. Electronically Signed: Nayan Diallo  4/7/2023 2:11 PM EDT  Workstation ID: [id number]    MRI Brain Without Contrast    Result Date: 4/7/2023  Impression: Impression: No acute abnormality is identified within the brain. Specifically, no diffusion restriction is identified to suggest acute infarct. Electronically Signed: Luc Richter  4/7/2023 2:59 PM EDT  Workstation ID: [id number]    MRI Lumbar Spine Without Contrast    Result Date: 4/8/2023  Impression: Impression: Multilevel lumbar degenerative disease Electronically Signed: Manish Ballard  4/8/2023 12:41 PM EDT  Workstation ID: [id number]    XR Chest 1 View    Result Date: 4/7/2023  Impression: No radiographic findings of acute cardiopulmonary abnormality.  Electronically Signed: Delvin Hima  4/7/2023 3:31 PM EDT  Workstation ID: [id number]    CT Head Without Contrast Stroke Protocol    Result Date: 4/7/2023  Impression: Impression: Normal noncontrast CT of the brain. Electronically Signed: Rafael Roman  4/7/2023 1:47 PM EDT  Workstation ID: [id number]    CT Angiogram Head w AI Analysis of LVO    Result Date: 4/7/2023  Impression: Impression: Major arterial vasculature within head and neck appears widely patent, with no hemodynamically significant stenosis, dissection, thrombus, or aneurysm. Electronically Signed: Nayan Diallo  4/7/2023 2:11 PM EDT  Workstation ID: [id number]              Results for orders placed during the hospital encounter of 04/07/23    Adult Transthoracic Echo Complete W/ Cont if Necessary Per Protocol (With Agitated Saline)    Interpretation Summary  •  Left ventricular ejection fraction appears to be 61 - 65%.  •  The right ventricular cavity is mildly dilated.  •  Saline test results are negative.  •  Estimated right ventricular systolic pressure from tricuspid regurgitation is normal (<35 mmHg).  •  No pericardial effusion noted      Labs Pending at Discharge:      Procedures Performed           Consults:    Consults     Date and Time Order Name Status Description    4/8/2023  4:11 PM IP Consult to Neurosurgery Completed     4/7/2023  4:23 PM Inpatient Neurology Consult Stroke      4/7/2023  2:39 PM Hospitalist (on-call MD unless specified)      4/7/2023  1:35 PM Inpatient Neurology Consult Stroke      4/7/2023  1:35 PM Inpatient Neurology Consult Stroke Completed             Discharge Details        Discharge Medications      New Medications      Instructions Start Date   cyclobenzaprine 10 MG tablet  Commonly known as: FLEXERIL   10 mg, Oral, 3 Times Daily PRN      oxyCODONE 5 MG immediate release tablet  Commonly known as: ROXICODONE   5 mg, Oral, Every 8 Hours PRN         Continue These Medications      Instructions Start Date   buPROPion  MG 24 hr tablet  Commonly known as: WELLBUTRIN XL   150 mg, Oral, Daily      lamoTRIgine 25 MG tablet  Commonly known as: LaMICtal   25 mg, Oral, 2 Times Daily      lisinopril 10 MG tablet  Commonly known as: PRINIVIL,ZESTRIL   10 mg, Oral, Daily      OXcarbazepine 150 MG tablet  Commonly known as: TRILEPTAL   450 mg, Oral, 2 Times Daily             Allergies   Allergen Reactions   • Adhesive Tape Rash         Discharge Disposition: Stable.  Home or Self Care    Diet:  Hospital:  Diet Order   Procedures   • Diet: Cardiac Diets; Healthy Heart (2-3 Na+); Texture: Regular Texture (IDDSI 7); Fluid Consistency: Thin (IDDSI 0)         Discharge Activity: As tolerated.        CODE STATUS:  Code Status and Medical Interventions:   Ordered at: 04/07/23 1623     Level Of Support Discussed With:    Patient     Code Status (Patient has no pulse and is not breathing):    CPR (Attempt to Resuscitate)     Medical Interventions (Patient has pulse or is breathing):    Full Support     Release to patient:    Routine Release         No future appointments.    Additional Instructions for the Follow-ups that You Need to Schedule     Ambulatory Referral to Physical Therapy Evaluate and treat    As directed      Specialty needed: Evaluate and treat    Follow-up needed: Yes               Time spent on Discharge including face to face service:55 minutes    This patient has been examined wearing appropriate Personal Protective Equipment and discussed with hospital infection control department, [facility] department, infectious disease specialist and pulmonologist. 04/09/23      Signature:Electronically signed by Jc Macedo MD, FACP, 04/09/23, 12:25 PM EDT.

## 2023-04-10 NOTE — CASE MANAGEMENT/SOCIAL WORK
Case Management Discharge Note      Final Note: home with spouse         Selected Continued Care - Discharged on 4/9/2023 Admission date: 4/7/2023 - Discharge disposition: Home or Self Care         Transportation Services  Private: Car    Final Discharge Disposition Code: 01 - home or self-care

## 2023-04-14 ENCOUNTER — TELEPHONE (OUTPATIENT)
Dept: NEUROSURGERY | Facility: CLINIC | Age: 43
End: 2023-04-14
Payer: MEDICAID

## 2023-04-14 NOTE — TELEPHONE ENCOUNTER
Caller: Lexis Hernandez    Relationship to patient: Self    Best call back number: [phone]    Patient is needing: PATIENT CALLED, PATIENT IS REQUESTING WE FAX HER PM REFERRAL TO [facility] FAX NUMBER: [phone], PHONE NUMBER: [phone]. PLEASE FAX REFERRAL. PLEASE CALL THE PATIENT TO ADVISE IT HAS BEEN FAXED.    THANK YOU